# Patient Record
Sex: FEMALE | Race: WHITE | Employment: FULL TIME | URBAN - METROPOLITAN AREA
[De-identification: names, ages, dates, MRNs, and addresses within clinical notes are randomized per-mention and may not be internally consistent; named-entity substitution may affect disease eponyms.]

---

## 2017-01-15 RX ORDER — BUPROPION HYDROCHLORIDE 300 MG/1
TABLET ORAL
Qty: 30 TAB | Refills: 2 | Status: SHIPPED | OUTPATIENT
Start: 2017-01-15 | End: 2017-05-24 | Stop reason: SDUPTHER

## 2017-01-24 ENCOUNTER — OFFICE VISIT (OUTPATIENT)
Dept: FAMILY MEDICINE CLINIC | Age: 37
End: 2017-01-24

## 2017-01-24 VITALS
SYSTOLIC BLOOD PRESSURE: 115 MMHG | TEMPERATURE: 97.9 F | HEART RATE: 88 BPM | DIASTOLIC BLOOD PRESSURE: 59 MMHG | RESPIRATION RATE: 20 BRPM | BODY MASS INDEX: 32.88 KG/M2 | WEIGHT: 192.6 LBS | HEIGHT: 64 IN

## 2017-01-24 DIAGNOSIS — J06.9 VIRAL URI: Primary | ICD-10-CM

## 2017-01-24 DIAGNOSIS — J02.9 SORE THROAT: ICD-10-CM

## 2017-01-24 LAB
S PYO AG THROAT QL: NEGATIVE
VALID INTERNAL CONTROL?: YES

## 2017-01-24 NOTE — PATIENT INSTRUCTIONS
Sore Throat: Care Instructions  Your Care Instructions    Infection by bacteria or a virus causes most sore throats. Cigarette smoke, dry air, air pollution, allergies, and yelling can also cause a sore throat. Sore throats can be painful and annoying. Fortunately, most sore throats go away on their own. If you have a bacterial infection, your doctor may prescribe antibiotics. Follow-up care is a key part of your treatment and safety. Be sure to make and go to all appointments, and call your doctor if you are having problems. It's also a good idea to know your test results and keep a list of the medicines you take. How can you care for yourself at home? · If your doctor prescribed antibiotics, take them as directed. Do not stop taking them just because you feel better. You need to take the full course of antibiotics. · Gargle with warm salt water once an hour to help reduce swelling and relieve discomfort. Use 1 teaspoon of salt mixed in 1 cup of warm water. · Take an over-the-counter pain medicine, such as acetaminophen (Tylenol), ibuprofen (Advil, Motrin), or naproxen (Aleve). Read and follow all instructions on the label. · Be careful when taking over-the-counter cold or flu medicines and Tylenol at the same time. Many of these medicines have acetaminophen, which is Tylenol. Read the labels to make sure that you are not taking more than the recommended dose. Too much acetaminophen (Tylenol) can be harmful. · Drink plenty of fluids. Fluids may help soothe an irritated throat. Hot fluids, such as tea or soup, may help decrease throat pain. · Use over-the-counter throat lozenges to soothe pain. Regular cough drops or hard candy may also help. These should not be given to young children because of the risk of choking. · Do not smoke or allow others to smoke around you. If you need help quitting, talk to your doctor about stop-smoking programs and medicines.  These can increase your chances of quitting for good.  · Use a vaporizer or humidifier to add moisture to your bedroom. Follow the directions for cleaning the machine. When should you call for help? Call your doctor now or seek immediate medical care if:  · You have new or worse trouble swallowing. · Your sore throat gets much worse on one side. Watch closely for changes in your health, and be sure to contact your doctor if you do not get better as expected. Where can you learn more? Go to http://daly-karthik.info/. Enter [phone] in the search box to learn more about \"Sore Throat: Care Instructions. \"  Current as of: July 29, 2016  Content Version: 11.1  © 2462-5682 Cloud[employer], Comr[employer]. Care instructions adapted under license by Media[employer] (which disclaims liability or warranty for this information). If you have questions about a medical condition or this instruction, always ask your healthcare professional. [address] any warranty or liability for your use of this information.

## 2017-01-24 NOTE — PROGRESS NOTES
Xavier Hensley is a 39 y.o. female   Chief Complaint   Patient presents with    Hoarse    Ear Pain     Bilateral    pt states has sore throat since last week and hoparse voice which is getting better. Pt  has PNA. Pt denies SOB, mild cough at nightnon prod. Took motrin with no relief. Chief Complaint   Patient presents with    Hoarse    Ear Pain     Bilateral     she is a 39y.o. year old female who presents for evalution. Reviewed PmHx, RxHx, FmHx, SocHx, AllgHx and updated and dated in the chart. Review of Systems - negative except as listed above in the HPI    Objective:     Vitals:    01/24/17 1025   BP: 115/59   Pulse: 88   Resp: 20   Temp: 97.9 °F (36.6 °C)   TempSrc: Oral   Weight: 192 lb 9.6 oz (87.4 kg)   Height: 5' 4\" (1.626 m)       Current Outpatient Prescriptions   Medication Sig    buPROPion XL (WELLBUTRIN XL) 300 mg XL tablet take 1 tablet by mouth every morning    pantoprazole (PROTONIX) 40 mg tablet take 1 tablet by mouth daily    ferrous sulfate (IRON) 325 mg (65 mg iron) EC tablet Take 1 Tab by mouth two (2) times a day.  SUMAtriptan (IMITREX) 100 mg tablet Take 1 Tab by mouth once as needed for Migraine for up to 1 dose. May repeat dose in 2 hours if no relief of symptoms    baclofen (LIORESAL) 10 mg tablet Take 1 Tab by mouth three (3) times daily. No current facility-administered medications for this visit. Physical Examination: General appearance - alert, well appearing, and in no distress  Eyes - pupils equal and reactive, extraocular eye movements intact  Ears - bilateral TM's and external ear canals normal  Mouth - erythematous  Neck - adenopathy noted ant cerv tender  Chest - clear to auscultation, no wheezes, rales or rhonchi, symmetric air entry  Heart - normal rate, regular rhythm, normal S1, S2, no murmurs, rubs, clicks or gallops      Assessment/ Plan:   Katy was seen today for hoarse and ear pain.     Diagnoses and all orders for this visit:    Viral URI  Supportive care discussed with pt   Sore throat  -     AMB POC RAPID STREP A  Neg strep     Follow-up Disposition:  Return if symptoms worsen or fail to improve. I have discussed the diagnosis with the patient and the intended plan as seen in the above orders. The patient has received an after-visit summary and questions were answered concerning future plans. Pt conveyed understanding of plan.     Medication Side Effects and Warnings were discussed with patient      Michelle Watts DO

## 2017-01-24 NOTE — PROGRESS NOTES
Chief Complaint   Patient presents with    Hoarse    Ear Pain     Bilateral       Patirnt seen in office today with nasal congestion, sore throat and bilateral ear pain.

## 2017-02-16 ENCOUNTER — OFFICE VISIT (OUTPATIENT)
Dept: FAMILY MEDICINE CLINIC | Age: 37
End: 2017-02-16

## 2017-02-16 VITALS
WEIGHT: 190 LBS | OXYGEN SATURATION: 99 % | HEART RATE: 97 BPM | HEIGHT: 64 IN | TEMPERATURE: 98.2 F | SYSTOLIC BLOOD PRESSURE: 129 MMHG | DIASTOLIC BLOOD PRESSURE: 81 MMHG | RESPIRATION RATE: 18 BRPM | BODY MASS INDEX: 32.44 KG/M2

## 2017-02-16 DIAGNOSIS — S46.911A RIGHT SHOULDER STRAIN, INITIAL ENCOUNTER: Primary | ICD-10-CM

## 2017-02-16 RX ORDER — TRAMADOL HYDROCHLORIDE 50 MG/1
50 TABLET ORAL
Qty: 30 TAB | Refills: 0 | Status: SHIPPED | OUTPATIENT
Start: 2017-02-16 | End: 2017-03-24 | Stop reason: SDUPTHER

## 2017-02-16 RX ORDER — PREDNISONE 10 MG/1
TABLET ORAL
Qty: 21 TAB | Refills: 0 | Status: SHIPPED | OUTPATIENT
Start: 2017-02-16 | End: 2017-03-16 | Stop reason: ALTCHOICE

## 2017-02-16 RX ORDER — BACLOFEN 10 MG/1
10 TABLET ORAL 3 TIMES DAILY
Qty: 30 TAB | Refills: 0 | Status: SHIPPED | OUTPATIENT
Start: 2017-02-16 | End: 2017-06-20

## 2017-02-16 NOTE — MR AVS SNAPSHOT
Visit Information Date & Time Provider Department Dept. Phone Encounter #  
 2/16/2017 10:40 AM Carmelita Adams MD [address] [facility] [phone] [id number] Upcoming Health Maintenance Date Due  
 PAP AKA CERVICAL CYTOLOGY 1/13/2017 DTaP/Tdap/Td series (2 - Td) 8/26/2026 Allergies as of 2/16/2017  Review Complete On: 2/16/2017 By: Carmelita Adams MD  
  
 Severity Noted Reaction Type Reactions Clindamycin High 10/17/2014    Anaphylaxis, Other (comments) \"lightheaded, disoriented, makes my legs hurts, and makes my tongue swell\" Acetaminophen  01/13/2014   Systemic Other (comments) Due to liver problems Current Immunizations  Reviewed on 10/23/2013 Name Date Influenza Vaccine (Quad) PF 10/23/2013 Tdap 8/26/2016 Not reviewed this visit You Were Diagnosed With   
  
 Codes Comments Right shoulder strain, initial encounter    -  Primary ICD-10-CM: F05.781B ICD-9-CM: 840.9 Vitals BP Pulse Temp Resp Height(growth percentile) Weight(growth percentile) 129/81 (BP 1 Location: Left arm, BP Patient Position: Sitting) 97 98.2 °F (36.8 °C) (Oral) 18 5' 4\" (1.626 m) 190 lb (86.2 kg) LMP SpO2 BMI OB Status Smoking Status 01/24/2017 (Exact Date) 99% 32.61 kg/m2 Having regular periods Former Smoker Vitals History BMI and BSA Data Body Mass Index Body Surface Area  
 32.61 kg/m 2 1.97 m 2 Preferred Pharmacy Pharmacy Name Phone [id number] [place] & [address] [phone] Your Updated Medication List  
  
   
This list is accurate as of: 2/16/17 11:31 AM.  Always use your most recent med list.  
  
  
  
  
 baclofen 10 mg tablet Commonly known as:  LIORESAL Take 1 Tab by mouth three (3) times daily. buPROPion  mg XL tablet Commonly known as:  WELLBUTRIN XL  
take 1 tablet by mouth every morning ferrous sulfate 325 mg (65 mg iron) EC tablet Commonly known as:  IRON Take 1 Tab by mouth two (2) times a day. pantoprazole 40 mg tablet Commonly known as:  PROTONIX  
take 1 tablet by mouth daily  
  
 predniSONE 10 mg dose pack Commonly known as:  STERAPRED DS See administration instruction per 10mg dose pack  
  
 traMADol 50 mg tablet Commonly known as:  ULTRAM  
Take 1 Tab by mouth every eight (8) hours as needed for Pain. Max Daily Amount: 150 mg.  
  
  
  
  
Prescriptions Printed Refills  
 traMADol (ULTRAM) 50 mg tablet 0 Sig: Take 1 Tab by mouth every eight (8) hours as needed for Pain. Max Daily Amount: 150 mg.  
 Class: Print Route: Oral  
  
Prescriptions Sent to Pharmacy Refills  
 baclofen (LIORESAL) 10 mg tablet 0 Sig: Take 1 Tab by mouth three (3) times daily. Class: Normal  
 Pharmacy: [facility] [address], [facility] [address] Ph #: [phone] Route: Oral  
 predniSONE (STERAPRED DS) 10 mg dose pack 0 Sig: See administration instruction per 10mg dose pack Class: Normal  
 Pharmacy: [facility] [address], [address] Ph #: [phone] Patient Instructions Shoulder Stretches: Exercises Your Care Instructions Here are some examples of exercises for your shoulder. Start each exercise slowly. Ease off the exercise if you start to have pain. Your doctor or physical therapist will tell you when you can start these exercises and which ones will work best for you. How to do the exercises Note: These exercises should cause you to feel a gentle stretch, but no pain. Shoulder stretch 1.  a doorway and place one arm against the door frame. Your elbow should be a little higher than your shoulder. 2. Relax your shoulders as you lean forward, allowing your chest and shoulder muscles to stretch. You can also turn your body slightly away from your arm to stretch the muscles even more. 3. Hold for 15 to 30 seconds. 4. Repeat 2 to 4 times with each arm. Shoulder and chest stretch Shoulder and chest stretch 1. While sitting, relax your upper body so you slump slightly in your chair. 2. As you breathe in, straighten your back and open your arms out to the sides. 3. Gently pull your shoulder blades back and downward. 4. Hold for 15 to 30 seconds as your breathe normally. 5. Repeat 2 to 4 times. Overhead stretch 1. Reach up over your head with both arms. 2. Hold for 15 to 30 seconds. 3. Repeat 2 to 4 times. Follow-up care is a key part of your treatment and safety. Be sure to make and go to all appointments, and call your doctor if you are having problems. It's also a good idea to know your test results and keep a list of the medicines you take. Where can you learn more? Go to http://daly-karthik.info/. Enter S254 in the search box to learn more about \"Shoulder Stretches: Exercises. \" Current as of: May 23, 2016 Content Version: 11.1 © 0550-9219 [employer], Incorporated. Care instructions adapted under license by Aito Technologies (which disclaims liability or warranty for this information). If you have questions about a medical condition or this instruction, always ask your healthcare professional. [place] [address] any warranty or liability for your use of this information. Introducing [facility] & HEALTH SERVICES! Dear Korina Frances: Thank you for requesting a Nanomech account. Our records indicate that you already have an active Nanomech account. You can access your account anytime at [email or web address] Did you know that you can access your hospital and ER discharge instructions at any time in Nanomech? You can also review all of your test results from your hospital stay or ER visit. Additional Information If you have questions, please visit the Frequently Asked Questions section of the GRAVIDI website at [email or web address] Remember, GRAVIDI is NOT to be used for urgent needs. For medical emergencies, dial 911. Now available from your iPhone and Android! Please provide this summary of care documentation to your next provider. Your primary care clinician is listed as Brien Busch. If you have any questions after today's visit, please call [phone].

## 2017-02-16 NOTE — PATIENT INSTRUCTIONS
Shoulder Stretches: Exercises  Your Care Instructions  Here are some examples of exercises for your shoulder. Start each exercise slowly. Ease off the exercise if you start to have pain. Your doctor or physical therapist will tell you when you can start these exercises and which ones will work best for you. How to do the exercises  Note: These exercises should cause you to feel a gentle stretch, but no pain. Shoulder stretch    1.  a doorway and place one arm against the door frame. Your elbow should be a little higher than your shoulder. 2. Relax your shoulders as you lean forward, allowing your chest and shoulder muscles to stretch. You can also turn your body slightly away from your arm to stretch the muscles even more. 3. Hold for 15 to 30 seconds. 4. Repeat 2 to 4 times with each arm. Shoulder and chest stretch    Shoulder and chest stretch  1. While sitting, relax your upper body so you slump slightly in your chair. 2. As you breathe in, straighten your back and open your arms out to the sides. 3. Gently pull your shoulder blades back and downward. 4. Hold for 15 to 30 seconds as your breathe normally. 5. Repeat 2 to 4 times. Overhead stretch    1. Reach up over your head with both arms. 2. Hold for 15 to 30 seconds. 3. Repeat 2 to 4 times. Follow-up care is a key part of your treatment and safety. Be sure to make and go to all appointments, and call your doctor if you are having problems. It's also a good idea to know your test results and keep a list of the medicines you take. Where can you learn more? Go to http://daly-karthik.info/. Enter S254 in the search box to learn more about \"Shoulder Stretches: Exercises. \"  Current as of: May 23, 2016  Content Version: 11.1  © 5653-6679 Be At One, Why Not Give Back. Care instructions adapted under license by Multigig (which disclaims liability or warranty for this information).  If you have questions about a medical condition or this instruction, always ask your healthcare professional. [place] [address] any warranty or liability for your use of this information.

## 2017-02-16 NOTE — PROGRESS NOTES
Pt here c/o ongoing pain in right shoulder x 1 month. States that pain has progressively worse over the past few weeks. 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 Has been taking Advil OTC for pain. Pt is currently taking eptinezumab by infusion every 3 months for migraine study. Subjective: (As above and below)     Chief Complaint   Patient presents with    Shoulder Pain     right shoulder     she is a 44y.o. year old female who presents for evaluation. Reviewed PmHx, RxHx, FmHx, SocHx, AllgHx and updated in chart. Review of Systems - negative except as listed above    Objective:     Vitals:    02/16/17 1057   BP: 129/81   Pulse: 97   Resp: 18   Temp: 98.2 °F (36.8 °C)   TempSrc: Oral   SpO2: 99%   Weight: 190 lb (86.2 kg)   Height: 5' 4\" (1.626 m)     Physical Examination: General appearance - alert, well appearing, and in no distress  Mental status - normal mood, behavior, speech, dress, motor activity, and thought processes  Eyes - pupils equal and reactive, extraocular eye movements intact  Mouth - mucous membranes moist, pharynx normal without lesions  Chest - clear to auscultation, no wheezes, rales or rhonchi, symmetric air entry  Heart - normal rate, regular rhythm, normal S1, S2, no murmurs, rubs, clicks or gallops  Musculoskeletal - right shoulder pain with overhead and cross body motion    Assessment/ Plan:   1. Right shoulder strain, initial encounter  Orders Placed This Encounter    baclofen (LIORESAL) 10 mg tablet     Sig: Take 1 Tab by mouth three (3) times daily. Dispense:  30 Tab     Refill:  0    predniSONE (STERAPRED DS) 10 mg dose pack     Sig: See administration instruction per 10mg dose pack     Dispense:  21 Tab     Refill:  0    traMADol (ULTRAM) 50 mg tablet     Sig: Take 1 Tab by mouth every eight (8) hours as needed for Pain. Max Daily Amount: 150 mg.      Dispense:  30 Tab     Refill:  0     Reviewed home exercises     Follow-up Disposition: As needed  I have discussed the diagnosis with the patient and the intended plan as seen in the above orders. The patient has received an after-visit summary and questions were answered concerning future plans.      Medication Side Effects and Warnings were discussed with patient: yes  Patient Labs were reviewed: yes  Patient Past Records were reviewed:  yes    Deysi Jolly M.D.  '

## 2017-02-16 NOTE — PROGRESS NOTES
Pt here c/o ongoing pain in right shoulder x 1 month. States that pain has progressively worse over the past few weeks. 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 Has been taking Advil OTC for pain. Pt is currently taking eptinezumab by infusion every 3 months for migraine study.

## 2017-03-16 ENCOUNTER — OFFICE VISIT (OUTPATIENT)
Dept: FAMILY MEDICINE CLINIC | Age: 37
End: 2017-03-16

## 2017-03-16 ENCOUNTER — HOSPITAL ENCOUNTER (OUTPATIENT)
Dept: GENERAL RADIOLOGY | Age: 37
Discharge: HOME | End: 2017-03-16
Payer: COMMERCIAL

## 2017-03-16 VITALS
OXYGEN SATURATION: 98 % | DIASTOLIC BLOOD PRESSURE: 86 MMHG | WEIGHT: 192.3 LBS | RESPIRATION RATE: 16 BRPM | BODY MASS INDEX: 32.83 KG/M2 | TEMPERATURE: 97.8 F | HEART RATE: 84 BPM | SYSTOLIC BLOOD PRESSURE: 121 MMHG | HEIGHT: 64 IN

## 2017-03-16 DIAGNOSIS — M25.511 CHRONIC RIGHT SHOULDER PAIN: Primary | ICD-10-CM

## 2017-03-16 DIAGNOSIS — G89.29 CHRONIC RIGHT SHOULDER PAIN: Primary | ICD-10-CM

## 2017-03-16 DIAGNOSIS — M25.511 CHRONIC RIGHT SHOULDER PAIN: ICD-10-CM

## 2017-03-16 DIAGNOSIS — G89.29 CHRONIC RIGHT SHOULDER PAIN: ICD-10-CM

## 2017-03-16 PROCEDURE — 73030 X-RAY EXAM OF SHOULDER: CPT

## 2017-03-16 RX ORDER — DICLOFENAC SODIUM 75 MG/1
75 TABLET, DELAYED RELEASE ORAL 2 TIMES DAILY
Qty: 60 TAB | Refills: 2 | Status: SHIPPED | OUTPATIENT
Start: 2017-03-16 | End: 2017-06-20

## 2017-03-16 NOTE — PROGRESS NOTES
Chief Complaint   Patient presents with    Shoulder Pain     (R) Shoulder Pain     Pt reports that medication helped for a short time, but pain returned and seems worse, with radiation up into neck and down into elbow. Pt reports that ultram, baclofen help with sleep but make her so tired that she oversleeps. Ice helps while it is on, but no long term relief. Pt reports that pain bother her while driving. Subjective: (As above and below)     Chief Complaint   Patient presents with    Shoulder Pain     (R) Shoulder Pain     she is a 44y.o. year old female who presents for evaluation. Reviewed PmHx, RxHx, FmHx, SocHx, AllgHx and updated in chart. Review of Systems - negative except as listed above    Objective:     Vitals:    03/16/17 1100   BP: 121/86   Pulse: 84   Resp: 16   Temp: 97.8 °F (36.6 °C)   TempSrc: Oral   SpO2: 98%   Weight: 192 lb 4.8 oz (87.2 kg)   Height: 5' 4\" (1.626 m)     Physical Examination: General appearance - alert, well appearing, and in no distress  Mental status - normal mood, behavior, speech, dress, motor activity, and thought processes  Mouth - mucous membranes moist, pharynx normal without lesions  Chest - clear to auscultation, no wheezes, rales or rhonchi, symmetric air entry  Heart - normal rate, regular rhythm, normal S1, S2, no murmurs, rubs, clicks or gallops  Musculoskeletal - limited ROM right shoulder    Assessment/ Plan:   1. Chronic right shoulder pain  -check x-ray, refer to PT if x-ray is normal, consider ortho if problem is identified. - XR SHOULDER RT AP/LAT MIN 2 V; Future  - REFERRAL TO ORTHOPEDIC SURGERY     Follow-up Disposition: As needed  I have discussed the diagnosis with the patient and the intended plan as seen in the above orders. The patient has received an after-visit summary and questions were answered concerning future plans.      Medication Side Effects and Warnings were discussed with patient: yes  Patient Labs were reviewed: yes  Patient Past Records were reviewed:  yes    Tino Stearns M.D.

## 2017-03-16 NOTE — MR AVS SNAPSHOT
Visit Information Date & Time Provider Department Dept. Phone Encounter #  
 3/16/2017 10:50 AM Soledad Rouse MD [address] [facility] [phone] [id number] Upcoming Health Maintenance Date Due  
 PAP AKA CERVICAL CYTOLOGY 1/13/2017 DTaP/Tdap/Td series (2 - Td) 8/26/2026 Allergies as of 3/16/2017  Review Complete On: 3/16/2017 By: Soledad Rouse MD  
  
 Severity Noted Reaction Type Reactions Clindamycin High 10/17/2014    Anaphylaxis, Other (comments) \"lightheaded, disoriented, makes my legs hurts, and makes my tongue swell\" Acetaminophen  01/13/2014   Systemic Other (comments) Due to liver problems Current Immunizations  Reviewed on 10/23/2013 Name Date Influenza Vaccine (Quad) PF 10/23/2013 Tdap 8/26/2016 Not reviewed this visit You Were Diagnosed With   
  
 Codes Comments Chronic right shoulder pain    -  Primary ICD-10-CM: M25.511, G89.29 ICD-9-CM: 719.41, 338.29 Vitals BP Pulse Temp Resp Height(growth percentile) Weight(growth percentile) 121/86 84 97.8 °F (36.6 °C) (Oral) 16 5' 4\" (1.626 m) 192 lb 4.8 oz (87.2 kg) SpO2 BMI OB Status Smoking Status 98% 33.01 kg/m2 Having regular periods Former Smoker Vitals History BMI and BSA Data Body Mass Index Body Surface Area 33.01 kg/m 2 1.98 m 2 Preferred Pharmacy [facility] Phone [id number] [address] [phone] Your Updated Medication List  
  
   
This list is accurate as of: 3/16/17 11:24 AM.  Always use your most recent med list.  
  
  
  
  
 baclofen 10 mg tablet Commonly known as:  LIORESAL Take 1 Tab by mouth three (3) times daily. buPROPion  mg XL tablet Commonly known as:  WELLBUTRIN XL  
take 1 tablet by mouth every morning  
  
 diclofenac EC 75 mg EC tablet Commonly known as:  VOLTAREN Take 1 Tab by mouth two (2) times a day. ferrous sulfate 325 mg (65 mg iron) EC tablet Commonly known as:  IRON Take 1 Tab by mouth two (2) times a day. pantoprazole 40 mg tablet Commonly known as:  PROTONIX  
take 1 tablet by mouth daily  
  
 traMADol 50 mg tablet Commonly known as:  ULTRAM  
Take 1 Tab by mouth every eight (8) hours as needed for Pain. Max Daily Amount: 150 mg.  
  
  
  
  
Prescriptions Sent to Pharmacy Refills  
 diclofenac EC (VOLTAREN) 75 mg EC tablet 2 Sig: Take 1 Tab by mouth two (2) times a day. Class: Normal  
 Pharmacy: [id number], [facility] [facility] Ph #: [phone] Route: Oral  
  
We Performed the Following REFERRAL TO ORTHOPEDIC SURGERY [REF62 Custom] To-Do List   
 03/16/2017 Imaging:  XR SHOULDER RT AP/LAT MIN 2 V Referral Information Referral ID Referred By Referred To  
  
 [id number] [facility] [address] [facility] [address] [place] [address] Visits Status Start Date End Date 1 New Request 3/16/17 3/16/18 If your referral has a status of pending review or denied, additional information will be sent to support the outcome of this decision. Introducing [facility] & HEALTH SERVICES! Dear Kandace Gilmore: Thank you for requesting a HIT Application Solutions account. Our records indicate that you already have an active H[employer] Application Solutions account. You can access your account anytime at [email or web address] Did you know that you can access your hospital and ER discharge instructions at any time in HIT Application Solutions? You can also review all of your test results from your hospital stay or ER visit. Additional Information If you have questions, please visit the Frequently Asked Questions section of the H[employer] Application Solutions website at [email or web address] Remember, H[employer] Application Solutions is NOT to be used for urgent needs. For medical emergencies, dial 911. Now available from your iPhone and Android! Please provide this summary of care documentation to your next provider. Your primary care clinician is listed as Adelfo Tovar. If you have any questions after today's visit, please call [phone].

## 2017-03-16 NOTE — PROGRESS NOTES
Chief Complaint   Patient presents with    Shoulder Pain     (R) Shoulder Pain     1. Have you been to the ER, urgent care clinic since your last visit? Hospitalized since your last visit? No    2. Have you seen or consulted any other health care providers outside of the [address] [place] [address] since your last visit? Include any pap smears or colon screening.  No

## 2017-03-22 DIAGNOSIS — K21.9 GASTROESOPHAGEAL REFLUX DISEASE WITHOUT ESOPHAGITIS: ICD-10-CM

## 2017-03-22 RX ORDER — PANTOPRAZOLE SODIUM 40 MG/1
TABLET, DELAYED RELEASE ORAL
Qty: 90 TAB | Refills: 0 | Status: SHIPPED | OUTPATIENT
Start: 2017-03-22 | End: 2017-06-28 | Stop reason: SDUPTHER

## 2017-03-24 ENCOUNTER — PATIENT MESSAGE (OUTPATIENT)
Dept: FAMILY MEDICINE CLINIC | Age: 37
End: 2017-03-24

## 2017-03-24 RX ORDER — TRAMADOL HYDROCHLORIDE 50 MG/1
50 TABLET ORAL
Qty: 30 TAB | Refills: 0 | OUTPATIENT
Start: 2017-03-24 | End: 2017-06-20

## 2017-04-23 RX ORDER — SUMATRIPTAN 100 MG/1
TABLET, FILM COATED ORAL
Qty: 12 TAB | Refills: 0 | Status: SHIPPED | OUTPATIENT
Start: 2017-04-23 | End: 2017-06-20

## 2017-04-24 ENCOUNTER — OFFICE VISIT (OUTPATIENT)
Dept: FAMILY MEDICINE CLINIC | Age: 37
End: 2017-04-24

## 2017-04-24 VITALS
OXYGEN SATURATION: 99 % | HEART RATE: 66 BPM | DIASTOLIC BLOOD PRESSURE: 80 MMHG | RESPIRATION RATE: 16 BRPM | SYSTOLIC BLOOD PRESSURE: 100 MMHG | WEIGHT: 195.38 LBS | TEMPERATURE: 98.1 F | HEIGHT: 64 IN | BODY MASS INDEX: 33.35 KG/M2

## 2017-04-24 DIAGNOSIS — R33.9 INCOMPLETE EMPTYING OF BLADDER: ICD-10-CM

## 2017-04-24 DIAGNOSIS — R30.9 URINARY PAIN: ICD-10-CM

## 2017-04-24 DIAGNOSIS — R35.0 URINARY FREQUENCY: Primary | ICD-10-CM

## 2017-04-24 LAB
BILIRUB UR QL STRIP: NEGATIVE
GLUCOSE UR-MCNC: NEGATIVE MG/DL
KETONES P FAST UR STRIP-MCNC: NEGATIVE MG/DL
PH UR STRIP: 7 [PH] (ref 4.6–8)
PROT UR QL STRIP: NORMAL MG/DL
SP GR UR STRIP: 1.01 (ref 1–1.03)
UA UROBILINOGEN AMB POC: NORMAL (ref 0.2–1)
URINALYSIS CLARITY POC: CLEAR
URINALYSIS COLOR POC: YELLOW
URINE BLOOD POC: NEGATIVE
URINE LEUKOCYTES POC: NORMAL
URINE NITRITES POC: NEGATIVE

## 2017-04-24 RX ORDER — LORATADINE 10 MG/1
10 TABLET ORAL
COMMUNITY

## 2017-04-24 RX ORDER — CIPROFLOXACIN 500 MG/1
500 TABLET ORAL 2 TIMES DAILY
Qty: 10 TAB | Refills: 0 | Status: SHIPPED | OUTPATIENT
Start: 2017-04-24 | End: 2017-04-29

## 2017-04-24 NOTE — LETTER
4/24/2017 8:31 AM 
 
Ms. Katy Orona [address] [place] [phone] Patient was seen in the office today due to illness.   
 
 
Sincerely, 
 
 
Rafael Hoyt NP

## 2017-04-24 NOTE — PROGRESS NOTES
1. Have you been to the ER, urgent care clinic since your last visit? Hospitalized since your last visit? No    2. Have you seen or consulted any other health care providers outside of the [facility] since your last visit? Include any pap smears or colon screening. No    Chief Complaint   Patient presents with    Urinary Frequency     pain & incomplete emptying x today     Pt states she has urinary frequency, pain & incomplete emptying starting this morning.

## 2017-04-24 NOTE — MR AVS SNAPSHOT
Visit Information Date & Time Provider Department Dept. Phone Encounter #  
 4/24/2017  8:30 AM Rachel Samayoa, JAYLA [address] [facility] [phone] [id number] Upcoming Health Maintenance Date Due  
 PAP AKA CERVICAL CYTOLOGY 1/13/2017 DTaP/Tdap/Td series (2 - Td) 8/26/2026 Allergies as of 4/24/2017  Review Complete On: 4/24/2017 By: Alice Paulson LPN Severity Noted Reaction Type Reactions Clindamycin High 10/17/2014    Anaphylaxis, Other (comments) \"lightheaded, disoriented, makes my legs hurts, and makes my tongue swell\" Acetaminophen  01/13/2014   Systemic Other (comments) Due to liver problems Current Immunizations  Reviewed on 10/23/2013 Name Date Influenza Vaccine (Quad) PF 10/23/2013 Tdap 8/26/2016 Not reviewed this visit You Were Diagnosed With   
  
 Codes Comments Urinary frequency    -  Primary ICD-10-CM: R35.0 ICD-9-CM: 788.41 Incomplete emptying of bladder     ICD-10-CM: R33.9 ICD-9-CM: 788.21 Urinary pain     ICD-10-CM: R30.9 ICD-9-CM: 828. 1 Vitals BP Pulse Temp Resp Height(growth percentile) Weight(growth percentile) 100/80 66 98.1 °F (36.7 °C) (Oral) 16 5' 4\" (1.626 m) 195 lb 6 oz (88.6 kg) LMP SpO2 BMI OB Status Smoking Status 04/01/2017 (Approximate) 99% 33.54 kg/m2 Having regular periods Former Smoker Vitals History BMI and BSA Data Body Mass Index Body Surface Area  
 33.54 kg/m 2 2 m 2 Preferred Pharmacy Pharmacy Name Phone [address] [address] Your Updated Medication List  
  
   
This list is accurate as of: 4/24/17  8:31 AM.  Always use your most recent med list.  
  
  
  
  
 baclofen 10 mg tablet Commonly known as:  LIORESAL Take 1 Tab by mouth three (3) times daily. buPROPion  mg XL tablet Commonly known as:  WELLBUTRIN XL  
take 1 tablet by mouth every morning ciprofloxacin HCl 500 mg tablet Commonly known as:  CIPRO Take 1 Tab by mouth two (2) times a day for 5 days. CLARITIN 10 mg tablet Generic drug:  loratadine Take 10 mg by mouth. diclofenac EC 75 mg EC tablet Commonly known as:  VOLTAREN Take 1 Tab by mouth two (2) times a day. ferrous sulfate 325 mg (65 mg iron) EC tablet Commonly known as:  IRON Take 1 Tab by mouth two (2) times a day. pantoprazole 40 mg tablet Commonly known as:  PROTONIX  
take 1 tablet by mouth daily SUMAtriptan 100 mg tablet Commonly known as:  IMITREX  
take 1 tablet by mouth ONCE if needed for migraines for UP TO 1 DOSE. MAY REPEAT DOSE IN 2 HOURS IF NO RELIEF OF SYMPTOMS  
  
 traMADol 50 mg tablet Commonly known as:  ULTRAM  
Take 1 Tab by mouth every eight (8) hours as needed for Pain. Max Daily Amount: 150 mg.  
  
  
  
  
Prescriptions Sent to Pharmacy Refills  
 ciprofloxacin HCl (CIPRO) 500 mg tablet 0 Sig: Take 1 Tab by mouth two (2) times a day for 5 days. Class: Normal  
 Pharmacy: [address] [place] [address], [address] [place] [address] #: [phone] Route: Oral  
  
We Performed the Following AMB POC URINALYSIS DIP STICK AUTO W/O MICRO [63898 CPT(R)] Introducing [facility] & HEALTH SERVICES! Dear Jacob Flores: Thank you for requesting a Conscious Box account. Our records indicate that you already have an active Conscious Box account. You can access your account anytime at [email or web address] Did you know that you can access your hospital and ER discharge instructions at any time in Conscious Box? You can also review all of your test results from your hospital stay or ER visit. Additional Information If you have questions, please visit the Frequently Asked Questions section of the Conscious Box website at [email or web address] Remember, Conscious Box is NOT to be used for urgent needs. For medical emergencies, dial 911. Now available from your iPhone and Android! Please provide this summary of care documentation to your next provider. Your primary care clinician is listed as Anaya Yeung. If you have any questions after today's visit, please call [phone].

## 2017-04-24 NOTE — PROGRESS NOTES
Maribel Perdomo is a 44 y.o. female who complains of dysuria, frequency, urgency for 4 days. Patient denies flank pain, vomiting, fever, unusual vaginal discharge. Patient does not have a history of recurrent UTI. Patient does not have a history of pyelonephritis. Review of Systems  Pertinent items are noted in HPI. Objective:     Visit Vitals    /80    Pulse 66    Temp 98.1 °F (36.7 °C) (Oral)    Resp 16    Ht 5' 4\" (1.626 m)    Wt 195 lb 6 oz (88.6 kg)    LMP 04/01/2017 (Approximate)    SpO2 99%    BMI 33.54 kg/m2     General:  alert, cooperative, no distress   Abdomen: soft, nontender, nondistended, no masses or organomegaly. Back:  CVA tenderness absent   :  defer exam     Laboratory:   Urine dipstick shows positive for leukocytes. Micro exam: not done. Assessment/Plan:     Acute cystitis     1. ciprofloxacin  2. Maintain adequate hydration  3. May use OTC pyridium as desired, which will turn urine orange/red color  4. Follow up if symptoms not improving, and prn. Encounter Diagnoses   Name Primary?  Urinary frequency Yes    Incomplete emptying of bladder     Urinary pain      Orders Placed This Encounter    AMB POC URINALYSIS DIP STICK AUTO W/O MICRO    loratadine (CLARITIN) 10 mg tablet    ciprofloxacin HCl (CIPRO) 500 mg tablet   . I have discussed the diagnosis with the patient and the intended plan as seen in the above orders. The patient has received an after-visit summary and questions were answered concerning future plans. Patient conveyed understanding of the plan at the time of the visit.     Dorcas Ríos, MSN, ANP  4/24/2017

## 2017-05-24 RX ORDER — BUPROPION HYDROCHLORIDE 300 MG/1
TABLET ORAL
Qty: 30 TAB | Refills: 2 | Status: SHIPPED | OUTPATIENT
Start: 2017-05-24 | End: 2017-09-09 | Stop reason: SDUPTHER

## 2017-06-20 ENCOUNTER — HOSPITAL ENCOUNTER (OUTPATIENT)
Dept: LAB | Age: 37
Discharge: HOME | End: 2017-06-20

## 2017-06-20 ENCOUNTER — OFFICE VISIT (OUTPATIENT)
Dept: OBGYN CLINIC | Age: 37
End: 2017-06-20

## 2017-06-20 VITALS
WEIGHT: 196 LBS | BODY MASS INDEX: 33.46 KG/M2 | SYSTOLIC BLOOD PRESSURE: 108 MMHG | HEIGHT: 64 IN | DIASTOLIC BLOOD PRESSURE: 68 MMHG

## 2017-06-20 DIAGNOSIS — N93.9 ABNORMAL UTERINE BLEEDING (AUB): Primary | ICD-10-CM

## 2017-06-20 NOTE — PROGRESS NOTES
Abnormal bleeding note      Katy Orona is a 44 y.o. female who complains of vaginal bleeding problems. Had US ordered by PCP last year - normal. No end bx done. She was put on iron. Her current method of family planning is tubal ligation. She developed this problem 2016 and it has gradually worsened. Last period lasted from 17-17. She has had vaginal bleeding which she describes as bright red, heavy, and lasting longer than normal. Periods were normal prior to 2016    Patient uses disposable menstrual cups. Associated symptoms include odor only during cycle and bleeding during/after intercourse. Alleviating factors: none    Aggravating factors: none      The patient is sexually active. Last Pap smear: , HPV neg    Her relevant past medical history:  Hx of anemia. Taking supplements 2x day. Blood work in cc. Past Medical History:   Diagnosis Date    Coagulation disorder ([address]     anemic    Dysmenorrhea 3/15/2010    Endometriosis 3/15/2010    GERD (gastroesophageal reflux disease)     Ill-defined condition     depression    Migraine 3/15/2010    Ulcer ([address]         Past Surgical History:   Procedure Laterality Date    C-SEC ONLY,PREV C-SEC      time 3    HX CHOLECYSTECTOMY  3/2012    HX GYN       10 799 2-06    HX GYN      d  & C    HX ORTHOPAEDIC  2012    right knee surg     Social History     Occupational History    Not on file.      Social History Main Topics    Smoking status: Former Smoker     Packs/day: 0.25     Years: 20.00     Quit date: 3/21/2014    Smokeless tobacco: Never Used    Alcohol use 0.5 oz/week     1 Shots of liquor per week      Comment: rarely    Drug use: No    Sexual activity: Yes     Partners: Male     Birth control/ protection: Surgical      Comment: tubal ligation     Family History   Problem Relation Age of Onset    Hypertension Father     Hypertension Brother     Heart Disease Maternal Grandfather Allergies   Allergen Reactions    Clindamycin Anaphylaxis and Other (comments)     \"lightheaded, disoriented, makes my legs hurts, and makes my tongue swell\"    Acetaminophen Other (comments)     Due to liver problems     Prior to Admission medications    Medication Sig Start Date End Date Taking? Authorizing Provider   buPROPion XL (WELLBUTRIN XL) 300 mg XL tablet take 1 tablet by mouth every morning 5/24/17  Yes Neri Yarbrough NP   loratadine (CLARITIN) 10 mg tablet Take 10 mg by mouth. Yes Historical Provider   pantoprazole (PROTONIX) 40 mg tablet take 1 tablet by mouth daily 3/22/17  Yes Neri Yarbrough NP   ferrous sulfate (IRON) 325 mg (65 mg iron) EC tablet Take 1 Tab by mouth two (2) times a day. 8/30/16  Yes Neri Yarbrough NP   SUMAtriptan (IMITREX) 100 mg tablet take 1 tablet by mouth ONCE if needed for migraines for UP TO 1 DOSE. MAY REPEAT DOSE IN 2 HOURS IF NO RELIEF OF SYMPTOMS 4/23/17   Neri Yarbrough NP   traMADol (ULTRAM) 50 mg tablet Take 1 Tab by mouth every eight (8) hours as needed for Pain. Max Daily Amount: 150 mg. 3/24/17   Xena Duran MD   diclofenac EC (VOLTAREN) 75 mg EC tablet Take 1 Tab by mouth two (2) times a day. 3/16/17   Xena Duran MD   baclofen (LIORESAL) 10 mg tablet Take 1 Tab by mouth three (3) times daily.  2/16/17   Xena Duran MD        Review of Systems - History obtained from the patient  Constitutional: negative for weight loss, fever, night sweats  HEENT: negative for hearing loss, earache, congestion, snoring, sorethroat  CV: negative for chest pain, palpitations, edema  Resp: negative for cough, shortness of breath, wheezing  Breast: negative for breast lumps, nipple discharge, galactorrhea  GI: negative for change in bowel habits, abdominal pain, black or bloody stools  : negative for frequency, dysuria, hematuria  MSK: negative for back pain, joint pain, muscle pain  Skin: negative for itching, rash, hives  Neuro: negative for dizziness, headache, confusion, weakness  Psych: negative for anxiety, depression, change in mood  Heme/lymph: negative for bleeding, bruising, pallor      Objective:    Visit Vitals    /68    Ht 5' 4\" (1.626 m)    Wt 196 lb (88.9 kg)    LMP 05/25/2017 (Exact Date)    BMI 33.64 kg/m2          PHYSICAL EXAMINATION    Constitutional  · Appearance: well-nourished, well developed, alert, in no acute distress    HENT  · Head and Face: appears normal    Neck  · Inspection/Palpation: normal appearance, no masses or tenderness  · Lymph Nodes: no lymphadenopathy present  · Thyroid: gland size normal, nontender, no nodules or masses present on palpation  ·   Breasts  · Inspection of Breasts: breasts symmetrical, no skin changes, no discharge present, nipple appearance normal, no skin retraction present  · Palpation of Breasts and Axillae: no masses present on palpation, no breast tenderness  · Axillary Lymph Nodes: no lymphadenopathy present    Gastrointestinal  · Abdominal Examination: abdomen non-tender to palpation, normal bowel sounds, no masses present  · Liver and spleen: no hepatomegaly present, spleen not palpable  · Hernias: no hernias identified    Genitourinary  · External Genitalia: normal appearance for age, no discharge present, no tenderness present, no inflammatory lesions present, no masses present, no atrophy present  · Vagina: normal vaginal vault without central or paravaginal defects, no discharge present, no inflammatory lesions present, no masses present  · Bladder: non-tender to palpation  · Urethra: appears normal  · Cervix: normal   · Uterus: normal size, shape and consistency  · Adnexa: no adnexal tenderness present, no adnexal masses present  · Perineum: perineum within normal limits, no evidence of trauma, no rashes or skin lesions present  · Anus: anus within normal limits, no hemorrhoids present  · Inguinal Lymph Nodes: no lymphadenopathy present    Skin  · General Inspection: no rash, no lesions identified    Neurologic/Psychiatric  · Mental Status:  · Orientation: grossly oriented to person, place and time  · Mood and Affect: mood normal, affect appropriate    Assessment:   AUB    Plan:   End bx done  TSH, prolactin  Provera 10mg x 10d  Menstrual calendar          [facility]  OFFICE PROCEDURE PROGRESS NOTE        Chart reviewed for the following:   Ryanne BROWN LPN, have reviewed the History, Physical and updated the Allergic reactions for Necol B Short     TIME OUT performed immediately prior to start of procedure:   Lachelle BROWN LPN, have performed the following reviews on Necol B Short prior to the start of the procedure:            * Patient was identified by name and date of birth   * Agreement on procedure being performed was verified  * Risks and Benefits explained to the patient  * Procedure site verified and marked as necessary  * Patient was positioned for comfort  * Consent was signed and verified     Time: 2:17 PM        Date of procedure: 2017    Procedure performed by:  Santos Ro MD    How tolerated by patient: tolerated the procedure well with no complications    Post Procedural Pain Scale: 2 - Hurts Little Bit    Comments: none    Procedure note: Endometrial biopsy    Katy Orona is a ,  44 y.o. female [facility] whose Patient's last menstrual period was 2017 (exact date). was on 2017. The patient has a history of The encounter diagnosis was Abnormal uterine bleeding (AUB). and presents for an endometrial biopsy. Indications:   After the indications, risks, benefits, and alternatives to performing an endometrial biopsy were explained to the patient, her questions were answered and informed consent was obtained. Procedure: The patient was placed on the table in the dorsal lithotomy position. A bimanual exam showed the uterus to be anterior. The uterus was normal size.   A speculum was placed in the vagina. The cervix was visualized and prepped with zephrin. A tenaculum was not placed on the anterior lip of the cervix for traction. It was not necessary to dilate the cervix. A pipelle was passed through the endocervical canal without difficulty. The uterus was sounded to 10 cm's. A moderate amount of tissue was returned. This tissue was placed in formalin and sent to pathology. It was felt that an adequate sample was obtained. due to cervical stenosis. The patient tolerated the procedure well and she reported mild cramping. The tenaculum and speculum were removed. Post Procedural Status: The patient was observed for 5  minutes. She had mild cramping at the time of discharge. There were no complications. The patient was discharged in stable condition.

## 2017-06-21 ENCOUNTER — TELEPHONE (OUTPATIENT)
Dept: OBGYN CLINIC | Age: 37
End: 2017-06-21

## 2017-06-21 LAB
PROLACTIN SERPL-MCNC: 8.5 NG/ML (ref 4.8–23.3)
SPECIMEN SENT TO LAB,INSX: NORMAL
TSH SERPL DL<=0.005 MIU/L-ACNC: 0.9 UIU/ML (ref 0.45–4.5)

## 2017-06-21 RX ORDER — MEDROXYPROGESTERONE ACETATE 10 MG/1
10 TABLET ORAL DAILY
Qty: 10 TAB | Refills: 0 | Status: SHIPPED | OUTPATIENT
Start: 2017-06-21 | End: 2018-02-19 | Stop reason: ALTCHOICE

## 2017-06-21 NOTE — TELEPHONE ENCOUNTER
Call received at 11:08 am    44year old patient last seen in the office on 6/20/17. Patient calling to say that she was supposed to have a prescription sent yesterday and it was not at the pharmacy. 6/20/17  Plan:   End bx done  TSH, prolactin  Provera 10mg x 10d  Menstrual calendaroffice on 6/20/17    ? ok to send Provera .  Please advise

## 2017-06-21 NOTE — TELEPHONE ENCOUNTER
Patient advised of MD recommendations and prescription sent as per MD order to patient preferred pharmacy. Patient verbalized understanding.

## 2017-06-28 DIAGNOSIS — K21.9 GASTROESOPHAGEAL REFLUX DISEASE WITHOUT ESOPHAGITIS: ICD-10-CM

## 2017-06-28 RX ORDER — PANTOPRAZOLE SODIUM 40 MG/1
TABLET, DELAYED RELEASE ORAL
Qty: 90 TAB | Refills: 0 | Status: SHIPPED | OUTPATIENT
Start: 2017-06-28 | End: 2017-09-23 | Stop reason: SDUPTHER

## 2017-06-29 ENCOUNTER — TELEPHONE (OUTPATIENT)
Dept: OBGYN CLINIC | Age: 37
End: 2017-06-29

## 2017-06-29 NOTE — TELEPHONE ENCOUNTER
Patient calling stating that she has been taking the provera and has taken 7 pills already but reports that she is having heavy bleeding and changing her menstrual cup q15min because it is leaking onto her pad. Patient is unsure if this is normal as the patient does not wear just pads. She does report cramping and does have some clotting. Please advise.

## 2017-06-29 NOTE — TELEPHONE ENCOUNTER
Finish provera. Should stop using cup and let the withdrawal bleed occur.  It will be very heavy but will stop

## 2017-06-30 LAB
DX ICD CODE: NORMAL
PATH REPORT.FINAL DX SPEC: NORMAL
PATH REPORT.GROSS SPEC: NORMAL
PATH REPORT.SITE OF ORIGIN SPEC: NORMAL
PATHOLOGIST NAME: NORMAL
PAYMENT PROCEDURE: NORMAL

## 2017-07-06 NOTE — PROGRESS NOTES
Patient aware of results and MD recommendations by phone. Patient aware to contact office back if no improvement on taking the medication.

## 2017-09-11 RX ORDER — BUPROPION HYDROCHLORIDE 300 MG/1
TABLET ORAL
Qty: 30 TAB | Refills: 2 | Status: SHIPPED | OUTPATIENT
Start: 2017-09-11 | End: 2017-12-17 | Stop reason: SDUPTHER

## 2017-09-23 DIAGNOSIS — K21.9 GASTROESOPHAGEAL REFLUX DISEASE WITHOUT ESOPHAGITIS: ICD-10-CM

## 2017-09-24 RX ORDER — PANTOPRAZOLE SODIUM 40 MG/1
TABLET, DELAYED RELEASE ORAL
Qty: 90 TAB | Refills: 0 | Status: SHIPPED | OUTPATIENT
Start: 2017-09-24 | End: 2018-02-14 | Stop reason: SDUPTHER

## 2017-12-19 RX ORDER — BUPROPION HYDROCHLORIDE 300 MG/1
TABLET ORAL
Qty: 30 TAB | Refills: 1 | Status: SHIPPED | OUTPATIENT
Start: 2017-12-19 | End: 2018-03-17 | Stop reason: SDUPTHER

## 2018-01-16 ENCOUNTER — OFFICE VISIT (OUTPATIENT)
Dept: FAMILY MEDICINE CLINIC | Age: 38
End: 2018-01-16

## 2018-01-16 VITALS
WEIGHT: 189 LBS | HEIGHT: 64 IN | DIASTOLIC BLOOD PRESSURE: 68 MMHG | HEART RATE: 102 BPM | OXYGEN SATURATION: 99 % | RESPIRATION RATE: 18 BRPM | SYSTOLIC BLOOD PRESSURE: 104 MMHG | TEMPERATURE: 100.3 F | BODY MASS INDEX: 32.27 KG/M2

## 2018-01-16 DIAGNOSIS — J11.1 INFLUENZA: Primary | ICD-10-CM

## 2018-01-16 RX ORDER — OSELTAMIVIR PHOSPHATE 75 MG/1
75 CAPSULE ORAL 2 TIMES DAILY
Qty: 10 CAP | Refills: 0 | Status: SHIPPED | OUTPATIENT
Start: 2018-01-16 | End: 2018-01-21

## 2018-01-16 NOTE — LETTER
NOTIFICATION RETURN TO WORK / SCHOOL 
 
1/16/2018 11:57 AM 
 
Ms. Katy Orona [address] [address] To Whom It May Concern: 
 
Katy Orona is currently under the care of [address]. She will return to work on: 1/22/18 If there are questions or concerns please have the patient contact our office. Sincerely, Annette Pozo, DO

## 2018-01-16 NOTE — PROGRESS NOTES
Arjun Olivo is a 44 y.o. female   Chief Complaint   Patient presents with    Cold Symptoms    pt states she felt a little blah last night and then this morning went to work at 430am until 10am then started to have a fever this am currently T 100.3. Pt has not taken anything and also has body aches. Pt drives a school bus.      she is a 44y.o. year old female who presents for evalution. Reviewed PmHx, RxHx, FmHx, SocHx, AllgHx and updated and dated in the chart. Review of Systems - negative except as listed above in the HPI    Objective:     Vitals:    01/16/18 1124   BP: 104/68   Pulse: (!) 102   Resp: 18   Temp: 100.3 °F (37.9 °C)   TempSrc: Oral   SpO2: 99%   Weight: 189 lb (85.7 kg)   Height: 5' 4\" (1.626 m)       Current Outpatient Prescriptions   Medication Sig    oseltamivir (TAMIFLU) 75 mg capsule Take 1 Cap by mouth two (2) times a day for 5 days.  buPROPion XL (WELLBUTRIN XL) 300 mg XL tablet TAKE ONE TABLET BY MOUTH EVERY MORNING    pantoprazole (PROTONIX) 40 mg tablet TAKE ONE TABLET BY MOUTH DAILY    medroxyPROGESTERone (PROVERA) 10 mg tablet Take 1 Tab by mouth daily.  loratadine (CLARITIN) 10 mg tablet Take 10 mg by mouth.  ferrous sulfate (IRON) 325 mg (65 mg iron) EC tablet Take 1 Tab by mouth two (2) times a day. No current facility-administered medications for this visit.         Physical Examination: General appearance - alert, well appearing, and in no distress  Eyes - pupils equal and reactive, extraocular eye movements intact  Ears - bilateral TM's and external ear canals normal  Nose - normal and patent, no erythema, discharge or polyps  Mouth - mucous membranes moist, pharynx normal without lesions  Neck - supple, no significant adenopathy  Chest - clear to auscultation, no wheezes, rales or rhonchi, symmetric air entry  Heart - normal rate, regular rhythm, normal S1, S2, no murmurs, rubs, clicks or gallops  Abdomen - soft, nontender, nondistended, no masses or organomegaly      Assessment/ Plan:   Diagnoses and all orders for this visit:    1. Influenza  -     oseltamivir (TAMIFLU) 75 mg capsule; Take 1 Cap by mouth two (2) times a day for 5 days. motrin prn  Follow-up Disposition:  Return if symptoms worsen or fail to improve. I have discussed the diagnosis with the patient and the intended plan as seen in the above orders. The patient has received an after-visit summary and questions were answered concerning future plans. Pt conveyed understanding of plan.     Medication Side Effects and Warnings were discussed with patient      Annette Pozo DO

## 2018-01-16 NOTE — PATIENT INSTRUCTIONS

## 2018-02-14 DIAGNOSIS — K21.9 GASTROESOPHAGEAL REFLUX DISEASE WITHOUT ESOPHAGITIS: ICD-10-CM

## 2018-02-14 RX ORDER — PANTOPRAZOLE SODIUM 40 MG/1
TABLET, DELAYED RELEASE ORAL
Qty: 90 TAB | Refills: 0 | Status: SHIPPED | OUTPATIENT
Start: 2018-02-14 | End: 2018-05-16 | Stop reason: SDUPTHER

## 2018-02-19 ENCOUNTER — OFFICE VISIT (OUTPATIENT)
Dept: FAMILY MEDICINE CLINIC | Age: 38
End: 2018-02-19

## 2018-02-19 VITALS
OXYGEN SATURATION: 97 % | DIASTOLIC BLOOD PRESSURE: 81 MMHG | RESPIRATION RATE: 20 BRPM | HEART RATE: 94 BPM | HEIGHT: 64 IN | BODY MASS INDEX: 33.29 KG/M2 | TEMPERATURE: 98.2 F | WEIGHT: 195 LBS | SYSTOLIC BLOOD PRESSURE: 119 MMHG

## 2018-02-19 DIAGNOSIS — R51.9 FACIAL PAIN: Primary | ICD-10-CM

## 2018-02-19 RX ORDER — BISMUTH SUBSALICYLATE 262 MG
1 TABLET,CHEWABLE ORAL DAILY
COMMUNITY

## 2018-02-19 RX ORDER — PREDNISONE 10 MG/1
TABLET ORAL
Qty: 1 PACKAGE | Refills: 0 | Status: SHIPPED | OUTPATIENT
Start: 2018-02-19 | End: 2018-09-26 | Stop reason: ALTCHOICE

## 2018-02-19 RX ORDER — CEPHALEXIN 500 MG/1
1000 CAPSULE ORAL 2 TIMES DAILY
Qty: 40 CAP | Refills: 0 | Status: SHIPPED | OUTPATIENT
Start: 2018-02-19 | End: 2018-03-01

## 2018-02-19 RX ORDER — IBUPROFEN 200 MG
TABLET ORAL
COMMUNITY

## 2018-02-19 NOTE — MR AVS SNAPSHOT
[address] [address] 
[phone] Patient: Angela Melvin MRN: [id number] HXX:[id number] Visit Information Date & Time Provider Department Dept. Phone Encounter #  
 2/19/2018  9:45 AM Tristan Baez MD [address] [facility] [phone] [id number] Follow-up Instructions Return if symptoms worsen or fail to improve. Upcoming Health Maintenance Date Due  
 PAP AKA CERVICAL CYTOLOGY 1/13/2017 Influenza Age 5 to Adult 8/1/2017 DTaP/Tdap/Td series (2 - Td) 8/26/2026 Allergies as of 2/19/2018  Review Complete On: 2/19/2018 By: Tristan Baez MD  
  
 Severity Noted Reaction Type Reactions Clindamycin High 10/17/2014    Anaphylaxis, Other (comments) \"lightheaded, disoriented, makes my legs hurts, and makes my tongue swell\" Acetaminophen  01/13/2014   Systemic Other (comments) Due to liver problems Current Immunizations  Reviewed on 10/23/2013 Name Date Influenza Vaccine (Quad) PF 10/23/2013 Tdap 8/26/2016 Not reviewed this visit You Were Diagnosed With   
  
 Codes Comments Facial pain    -  Primary ICD-10-CM: O00 ICD-9-CM: 495. 0 Vitals BP Pulse Temp Resp Height(growth percentile) Weight(growth percentile) 119/81 94 98.2 °F (36.8 °C) 20 5' 4\" (1.626 m) 195 lb (88.5 kg) LMP SpO2 BMI OB Status Smoking Status 01/29/2018 97% 33.47 kg/m2 Having regular periods Former Smoker Vitals History BMI and BSA Data Body Mass Index Body Surface Area  
 33.47 kg/m 2 2 m 2 Preferred Pharmacy Pharmacy Name Phone [facility]enn[facility] Estrada [address], [address] [phone] Your Updated Medication List  
  
   
This list is accurate as of: 2/19/18  9:48 AM.  Always use your most recent med list. ADVIL 200 mg tablet Generic drug:  ibuprofen Take  by mouth. buPROPion  mg XL tablet Commonly known as:  WELLBUTRIN XL  
TAKE ONE TABLET BY MOUTH EVERY MORNING  
  
 cephALEXin 500 mg capsule Commonly known as:  Hahira Guru Take 2 Caps by mouth two (2) times a day for 10 days. CLARITIN 10 mg tablet Generic drug:  loratadine Take 10 mg by mouth.  
  
 multivitamin tablet Commonly known as:  ONE A DAY Take 1 Tab by mouth daily. pantoprazole 40 mg tablet Commonly known as:  PROTONIX  
TAKE ONE TABLET BY MOUTH DAILY predniSONE 10 mg dose pack Commonly known as:  STERAPRED DS  
6 Day DS taper pack as directed Prescriptions Sent to Pharmacy Refills  
 predniSONE (STERAPRED DS) 10 mg dose pack 0 Si Day DS taper pack as directed Class: Normal  
 [facility]acy: [facility], [address] Ph #: [phone]  
 cephALEXin (KEFLEX) 500 mg capsule 0 Sig: Take 2 Caps by mouth two (2) times a day for 10 days. Class: Normal  
 Pharmacy: [facility] [address] [place] [address], [address] Ph #: [phone] Route: Oral  
  
Follow-up Instructions Return if symptoms worsen or fail to improve. Introducing [facility] & [facility] SERVICES! Dear Margie Mason: Thank you for requesting a Seam[employer] Receipts account. Our records indicate that you already have an active Seamless Receipts account. You can access your account anytime at [email or web address] Did you know that you can access your hospital and ER discharge instructions at any time in Seamless Receipts? You can also review all of your test results from your hospital stay or ER visit. Additional Information If you have questions, please visit the Frequently Asked Questions section of the Seamless Receipts website at [email or web address] Remember, Seamless Receipts is NOT to be used for urgent needs. For medical emergencies, dial 911. Now available from your iPhone and Android! Please provide this summary of care documentation to your next provider. Your primary care clinician is listed as Erven Klinefelter. If you have any questions after today's visit, please call [phone].

## 2018-02-19 NOTE — PROGRESS NOTES
Patient here for right sided facial pain, swelling x 2 days. She has been taking advil  For some relief but does not feel like it has gotten much better. She does states swelling has gotten somewhat better,  But pain is progressively getting worse. 1. Have you been to the ER, urgent care clinic since your last visit? Hospitalized since your last visit? No    2. Have you seen or consulted any other health care providers outside of the [address] [place] [address] since your last visit? Include any pap smears or colon screening. No     No tooth pain    No trauma      Chief Complaint   Patient presents with    Facial Pain     right facial pain, swelling since Friday     She is a 43 y.o. female who presents for evalution. Reviewed PmHx, RxHx, FmHx, SocHx, AllgHx and updated and dated in the chart. Patient Active Problem List    Diagnosis    Iron deficiency anemia    Depression    Anxiety    Migraine    Dysmenorrhea    Endometriosis       Review of Systems - negative except as listed above in the HPI    Objective:     Vitals:    02/19/18 0936   BP: 119/81   Pulse: 94   Resp: 20   Temp: 98.2 °F (36.8 °C)   SpO2: 97%   Weight: 195 lb (88.5 kg)   Height: 5' 4\" (1.626 m)     Physical Examination: General appearance - alert, well appearing, and in no distress  r buccal area swollen and tender, no tooth issues    Assessment/ Plan:   Diagnoses and all orders for this visit:    1. Facial pain  -     predniSONE (STERAPRED DS) 10 mg dose pack; 6 Day DS taper pack as directed  -     cephALEXin (KEFLEX) 500 mg capsule; Take 2 Caps by mouth two (2) times a day for 10 days. -? Parotid stone vs other       Follow-up Disposition:  Return if symptoms worsen or fail to improve. I have discussed the diagnosis with the patient and the intended plan as seen in the above orders. The patient understands and agrees with the plan.  The patient has received an after-visit summary and questions were answered concerning future plans.     Medication Side Effects and Warnings were discussed with patient  Patient Labs were reviewed and or requested:  Patient Past Records were reviewed and or requested    Deysi Khan M.D. There are no Patient Instructions on file for this visit.

## 2018-03-17 RX ORDER — BUPROPION HYDROCHLORIDE 300 MG/1
TABLET ORAL
Qty: 30 TAB | Refills: 2 | Status: SHIPPED | OUTPATIENT
Start: 2018-03-17 | End: 2018-06-25 | Stop reason: SDUPTHER

## 2018-05-16 DIAGNOSIS — K21.9 GASTROESOPHAGEAL REFLUX DISEASE WITHOUT ESOPHAGITIS: ICD-10-CM

## 2018-05-16 RX ORDER — PANTOPRAZOLE SODIUM 40 MG/1
TABLET, DELAYED RELEASE ORAL
Qty: 90 TAB | Refills: 0 | Status: SHIPPED | OUTPATIENT
Start: 2018-05-16 | End: 2018-08-24 | Stop reason: SDUPTHER

## 2018-06-25 ENCOUNTER — TELEPHONE (OUTPATIENT)
Dept: FAMILY MEDICINE CLINIC | Age: 38
End: 2018-06-25

## 2018-06-25 RX ORDER — SUMATRIPTAN 100 MG/1
100 TABLET, FILM COATED ORAL
Qty: 9 TAB | Refills: 2 | Status: SHIPPED | OUTPATIENT
Start: 2018-06-25 | End: 2018-06-25

## 2018-06-25 RX ORDER — BUPROPION HYDROCHLORIDE 300 MG/1
TABLET ORAL
Qty: 30 TAB | Refills: 1 | Status: SHIPPED | OUTPATIENT
Start: 2018-06-25 | End: 2018-10-07 | Stop reason: SDUPTHER

## 2018-06-26 NOTE — TELEPHONE ENCOUNTER
----- Message from [address] Short sent at 6/22/2018  1:38 PM EDT -----  Regarding: Prescription Question  Contact: [phone]  Hi! I was wondering if I could get a refill of my migraine meds (sumatriptan) sent to my pharmacy [phone])? I didnt see it listed as an option under refill requests. Thank you!

## 2018-08-24 DIAGNOSIS — K21.9 GASTROESOPHAGEAL REFLUX DISEASE WITHOUT ESOPHAGITIS: ICD-10-CM

## 2018-08-24 RX ORDER — PANTOPRAZOLE SODIUM 40 MG/1
TABLET, DELAYED RELEASE ORAL
Qty: 90 TAB | Refills: 0 | Status: SHIPPED | OUTPATIENT
Start: 2018-08-24 | End: 2019-01-31 | Stop reason: SDUPTHER

## 2018-09-26 ENCOUNTER — OFFICE VISIT (OUTPATIENT)
Dept: FAMILY MEDICINE CLINIC | Age: 38
End: 2018-09-26

## 2018-09-26 VITALS
TEMPERATURE: 98.5 F | OXYGEN SATURATION: 98 % | WEIGHT: 196 LBS | DIASTOLIC BLOOD PRESSURE: 74 MMHG | SYSTOLIC BLOOD PRESSURE: 106 MMHG | BODY MASS INDEX: 33.46 KG/M2 | HEART RATE: 66 BPM | RESPIRATION RATE: 17 BRPM | HEIGHT: 64 IN

## 2018-09-26 DIAGNOSIS — Z00.00 WELL ADULT EXAM: Primary | ICD-10-CM

## 2018-09-26 RX ORDER — SUMATRIPTAN 100 MG/1
100 TABLET, FILM COATED ORAL
COMMUNITY

## 2018-09-26 RX ORDER — CETIRIZINE HCL 10 MG
TABLET ORAL
COMMUNITY

## 2018-09-26 RX ORDER — MONTELUKAST SODIUM 10 MG/1
10 TABLET ORAL DAILY
COMMUNITY

## 2018-09-26 NOTE — PROGRESS NOTES
Subjective:   43 y.o. female for Well Woman Check. Her gyne and breast care is done elsewhere by her Ob-Gyne physician. Patient Active Problem List    Diagnosis Date Noted    Iron deficiency anemia 08/30/2016    Depression 03/18/2013    Anxiety 01/19/2011    Migraine 03/15/2010    Dysmenorrhea 03/15/2010    Endometriosis 03/15/2010     Current Outpatient Prescriptions   Medication Sig Dispense Refill    montelukast (SINGULAIR) 10 mg tablet Take 10 mg by mouth daily.  fluticasone propionate (FLONASE NA) by Nasal route.  cetirizine (ZYRTEC) 10 mg tablet Take  by mouth.  SUMAtriptan (IMITREX) 100 mg tablet Take 100 mg by mouth once as needed for Migraine.  pantoprazole (PROTONIX) 40 mg tablet TAKE ONE TABLET BY MOUTH DAILY 90 Tab 0    buPROPion XL (WELLBUTRIN XL) 300 mg XL tablet TAKE ONE TABLET BY MOUTH EVERY MORNING 30 Tab 1    multivitamin (ONE A DAY) tablet Take 1 Tab by mouth daily.  ibuprofen (ADVIL) 200 mg tablet Take  by mouth.  loratadine (CLARITIN) 10 mg tablet Take 10 mg by mouth. Family History   Problem Relation Age of Onset    Hypertension Father     Hypertension Brother     Heart Disease Maternal Grandfather      Social History   Substance Use Topics    Smoking status: Former Smoker     Packs/day: 0.25     Years: 20.00     Quit date: 3/21/2014    Smokeless tobacco: Never Used    Alcohol use 0.5 oz/week     1 Shots of liquor per week      Comment: rarely             ROS: Feeling generally well. No TIA's or unusual headaches, no dysphagia. No prolonged cough. No dyspnea or chest pain on exertion. No abdominal pain, change in bowel habits, black or bloody stools. No urinary tract symptoms. No new or unusual musculoskeletal symptoms. Specific concerns today: none. Objective: The patient appears well, alert, oriented x 3, in no distress.   Visit Vitals    /74    Pulse 66    Temp 98.5 °F (36.9 °C)    Resp 17    Ht 5' 4\" (1.626 m)  Wt 196 lb (88.9 kg)    LMP 08/29/2018 (Approximate)    SpO2 98%    BMI 33.64 kg/m2     ENT normal.  Neck supple. No adenopathy or thyromegaly. MELISSA. Lungs are clear, good air entry, no wheezes, rhonchi or rales. S1 and S2 normal, no murmurs, regular rate and rhythm. Abdomen soft without tenderness, guarding, mass or organomegaly. Extremities show no edema, normal peripheral pulses. Neurological is normal, no focal findings. Breast and Pelvic exams are deferred. Assessment/Plan:   Well Woman  lose weight, increase physical activity, follow low fat diet, follow low salt diet, routine labs ordered  Encounter Diagnoses   Name Primary?  Well adult exam Yes     Orders Placed This Encounter    CBC WITH AUTOMATED DIFF    LIPID PANEL    METABOLIC PANEL, COMPREHENSIVE    TSH 3RD GENERATION    montelukast (SINGULAIR) 10 mg tablet    fluticasone propionate (FLONASE NA)    cetirizine (ZYRTEC) 10 mg tablet    SUMAtriptan (IMITREX) 100 mg tablet     Labs updated today  Follow up pending results    I have discussed the diagnosis with the patient and the intended plan as seen in the above orders. The patient has received an after-visit summary and questions were answered concerning future plans. Patient conveyed understanding of the plan at the time of the visit.     Maritza Patterson, MSN, ANP  9/26/2018

## 2018-09-26 NOTE — MR AVS SNAPSHOT
[address] [address] 
[phone] Patient: Bertrand Wang MRN: [id number] Visit Information Date & Time Provider Department Dept. Phone Encounter #  
 9/26/2018 10:30 AM Betty Michel NP [facility] [phone] [id number] Your Appointments 10/12/2018 10:30 AM  
PHYSICAL PRE OP with Supriya Coleman NP [facility] ([facility] Appt Note: cpe with PAP  
 [address] [id number] [phone]  
  
   
 [address] [address] Upcoming Health Maintenance Date Due  
 PAP AKA CERVICAL CYTOLOGY 1/13/2017 Influenza Age 5 to Adult 8/1/2018 DTaP/Tdap/Td series (2 - Td) 8/26/2026 Allergies as of 9/26/2018  Review Complete On: 9/26/2018 By: Gianfranco Soto LPN Severity Noted Reaction Type Reactions Clindamycin High 10/17/2014    Anaphylaxis, Other (comments) \"lightheaded, disoriented, makes my legs hurts, and makes my tongue swell\" Acetaminophen  01/13/2014   Systemic Other (comments) Due to liver problems Current Immunizations  Reviewed on 10/23/2013 Name Date Influenza Vaccine (Quad) PF 10/23/2013 Tdap 8/26/2016 Not reviewed this visit You Were Diagnosed With   
  
 Codes Comments Well adult exam    -  Primary ICD-10-CM: Z00.00 ICD-9-CM: V70.0 Vitals BP Pulse Temp Resp Height(growth percentile) Weight(growth percentile) 106/74 66 98.5 °F (36.9 °C) 17 5' 4\" (1.626 m) 196 lb (88.9 kg) LMP SpO2 BMI OB Status Smoking Status 08/29/2018 (Approximate) 98% 33.64 kg/m2 Unknown Former Smoker Vitals History BMI and BSA Data Body Mass Index Body Surface Area  
 33.64 kg/m 2 2 m 2 Preferred Pharmacy Pharmacy Name Phone [facility] [address], [address] [phone] Your Updated Medication List  
  
 This list is accurate as of 9/26/18 11:22 AM.  Always use your most recent med list. ADVIL 200 mg tablet Generic drug:  ibuprofen Take  by mouth. buPROPion  mg XL tablet Commonly known as:  WELLBUTRIN XL  
TAKE ONE TABLET BY MOUTH EVERY MORNING  
  
 cetirizine 10 mg tablet Commonly known as:  ZYRTEC Take  by mouth. CLARITIN 10 mg tablet Generic drug:  loratadine Take 10 mg by mouth. FLONASE NA  
by Nasal route. IMITREX 100 mg tablet Generic drug:  SUMAtriptan Take 100 mg by mouth once as needed for Migraine. multivitamin tablet Commonly known as:  ONE A DAY Take 1 Tab by mouth daily. pantoprazole 40 mg tablet Commonly known as:  PROTONIX  
TAKE ONE TABLET BY MOUTH DAILY  
  
 SINGULAIR 10 mg tablet Generic drug:  montelukast  
Take 10 mg by mouth daily. We Performed the Following CBC WITH AUTOMATED DIFF [76191 CPT(R)] LIPID PANEL [66781 CPT(R)] METABOLIC PANEL, COMPREHENSIVE [25228 CPT(R)] TSH 3RD GENERATION [36944 CPT(R)] Introducing [facility] [place] [facility] SERVICES! Dear Anastacio Glover: Thank you for requesting a zeenworld account. Our records indicate that you already have an active zeenworld account. You can access your account anytime at [email or web address] Did you know that you can access your hospital and ER discharge instructions at any time in zeenworld? You can also review all of your test results from your hospital stay or ER visit. Additional Information If you have questions, please visit the Frequently Asked Questions section of the zeenworld website at [email or web address] Remember, zeenworld is NOT to be used for urgent needs. For medical emergencies, dial 911. Now available from your iPhone and Android! Please provide this summary of care documentation to your next provider. Your primary care clinician is listed as Kacy Kaplan. If you have any questions after today's visit, please call [phone].

## 2018-09-26 NOTE — PROGRESS NOTES
Chief Complaint   Patient presents with    Labs      1. Have you been to the ER, urgent care clinic since your last visit? Hospitalized since your last visit? No    2. Have you seen or consulted any other health care providers outside of the [address] [place] [address] since your last visit? Include any pap smears or colon screening.  No

## 2018-09-27 LAB
ALBUMIN SERPL-MCNC: 4.5 G/DL (ref 3.5–5.5)
ALBUMIN/GLOB SERPL: 1.7 {RATIO} (ref 1.2–2.2)
ALP SERPL-CCNC: 52 IU/L (ref 39–117)
ALT SERPL-CCNC: 27 IU/L (ref 0–32)
AST SERPL-CCNC: 26 IU/L (ref 0–40)
BASOPHILS # BLD AUTO: 0 X10E3/UL (ref 0–0.2)
BASOPHILS NFR BLD AUTO: 0 %
BILIRUB SERPL-MCNC: <0.2 MG/DL (ref 0–1.2)
BUN SERPL-MCNC: 14 MG/DL (ref 6–20)
BUN/CREAT SERPL: 16 (ref 9–23)
CALCIUM SERPL-MCNC: 9.4 MG/DL (ref 8.7–10.2)
CHLORIDE SERPL-SCNC: 106 MMOL/L (ref 96–106)
CHOLEST SERPL-MCNC: 175 MG/DL (ref 100–199)
CO2 SERPL-SCNC: 22 MMOL/L (ref 20–29)
CREAT SERPL-MCNC: 0.86 MG/DL (ref 0.57–1)
EOSINOPHIL # BLD AUTO: 0.2 X10E3/UL (ref 0–0.4)
EOSINOPHIL NFR BLD AUTO: 2 %
ERYTHROCYTE [DISTWIDTH] IN BLOOD BY AUTOMATED COUNT: 18.8 % (ref 12.3–15.4)
GLOBULIN SER CALC-MCNC: 2.6 G/DL (ref 1.5–4.5)
GLUCOSE SERPL-MCNC: 85 MG/DL (ref 65–99)
HCT VFR BLD AUTO: 34.8 % (ref 34–46.6)
HDLC SERPL-MCNC: 56 MG/DL
HGB BLD-MCNC: 10.8 G/DL (ref 11.1–15.9)
IMM GRANULOCYTES # BLD: 0 X10E3/UL (ref 0–0.1)
IMM GRANULOCYTES NFR BLD: 0 %
INTERPRETATION, 910389: NORMAL
LDLC SERPL CALC-MCNC: 105 MG/DL (ref 0–99)
LYMPHOCYTES # BLD AUTO: 2.7 X10E3/UL (ref 0.7–3.1)
LYMPHOCYTES NFR BLD AUTO: 34 %
MCH RBC QN AUTO: 22.4 PG (ref 26.6–33)
MCHC RBC AUTO-ENTMCNC: 31 G/DL (ref 31.5–35.7)
MCV RBC AUTO: 72 FL (ref 79–97)
MONOCYTES # BLD AUTO: 0.6 X10E3/UL (ref 0.1–0.9)
MONOCYTES NFR BLD AUTO: 8 %
NEUTROPHILS # BLD AUTO: 4.3 X10E3/UL (ref 1.4–7)
NEUTROPHILS NFR BLD AUTO: 56 %
PLATELET # BLD AUTO: 363 X10E3/UL (ref 150–379)
POTASSIUM SERPL-SCNC: 4.9 MMOL/L (ref 3.5–5.2)
PROT SERPL-MCNC: 7.1 G/DL (ref 6–8.5)
RBC # BLD AUTO: 4.82 X10E6/UL (ref 3.77–5.28)
SODIUM SERPL-SCNC: 143 MMOL/L (ref 134–144)
TRIGL SERPL-MCNC: 70 MG/DL (ref 0–149)
TSH SERPL DL<=0.005 MIU/L-ACNC: 0.87 UIU/ML (ref 0.45–4.5)
VLDLC SERPL CALC-MCNC: 14 MG/DL (ref 5–40)
WBC # BLD AUTO: 7.8 X10E3/UL (ref 3.4–10.8)

## 2018-10-18 ENCOUNTER — OFFICE VISIT (OUTPATIENT)
Dept: FAMILY MEDICINE CLINIC | Age: 38
End: 2018-10-18

## 2018-10-18 VITALS
HEIGHT: 64 IN | RESPIRATION RATE: 18 BRPM | HEART RATE: 65 BPM | SYSTOLIC BLOOD PRESSURE: 122 MMHG | OXYGEN SATURATION: 98 % | TEMPERATURE: 98.4 F | WEIGHT: 199 LBS | BODY MASS INDEX: 33.97 KG/M2 | DIASTOLIC BLOOD PRESSURE: 85 MMHG

## 2018-10-18 DIAGNOSIS — Z01.419 ENCOUNTER FOR ANNUAL ROUTINE GYNECOLOGICAL EXAMINATION: Primary | ICD-10-CM

## 2018-10-18 DIAGNOSIS — Z12.4 SCREENING FOR MALIGNANT NEOPLASM OF CERVIX: ICD-10-CM

## 2018-10-18 DIAGNOSIS — Z23 ENCOUNTER FOR IMMUNIZATION: ICD-10-CM

## 2018-10-18 NOTE — PATIENT INSTRUCTIONS
Try eating a high protein breakfast. I recommend EGGS since they are high in protein. Try eating 1 egg with 2 egg whites. That should keep you feeling nice and full all morning. You could also try nonfat greek yogurt with a small amount of the trail mix or granola. I recommend eating an apple as a mid morning snack. You can add a little bit of peanut butter or almond butter (less sweet option) for the extra protein. For lunch I recommend a protein like fish, chicken, turkey breast, etc. If you are going to have a sandwich try whole grain bread and start with half a sandwich to see if that satiates you. I think wraps (like spinach wraps) are better. You can put as many veggies on it as you want like onion, tomato, lettuce. Try mustard instead of woo because woo is high in fat. If you need a crunchy/salty side try pop chips or hansel chips. They are less fattening that potato chips. For a mid afternoon snack I recommend carrots with humus. Humus is so good for you and so filling due to the high protein. For dinner stick with the protein like fish, chicken or turkey. For starch I recommend quinoa, brown rice, sweet potato, or cous cous. Always eat a vegetable with your dinner. Spinach and kale are great because they fill you up and are full of vitamins and minerals. I know that its difficult to stop craving sweets in the evening. For this I recommend purple or green grapes because they will curb your craving. Pineapple is another delicious alternative. Another idea is any of those \"100 calorie\" desserts like skinny cow. Just make sure you only have one. Drink as much water as you possibly can all day long. Try adding fresh slices of lemon to curb cravings. Also purchase dany seeds. If you sprinkle them on your food, they \"blow up\" in the stomach and make you feel [place] faster.  I usually put in on yogurt or peanut butter toast. You can really put it on anything and you can find them pretty much everywhere these days. You should join [employer] at www.[employer]ara[employer]ia.com. They have an endless supply of healthy recipes. Get a fit bit to track your steps. Try getting 10,000 steps per day. I recommend the [employer].

## 2018-10-18 NOTE — PROGRESS NOTES
Chief Complaint   Patient presents with   Dino Loyola     Patient in office today for cpe and gyn exam.    Fasting labs were completed couple wks ago. Health Maintenance Due   Topic Date Due    PAP AKA CERVICAL CYTOLOGY  01/13/2017    Influenza Age 5 to Adult  08/01/2018     Denies any vaginal discharge. Denies any urinary sx. Periods are normal for patient. Denies any history of abnormal pap smear. Denies any other concerns at this time. Chief Complaint   Patient presents with   Dino Loyola     she is a 43y.o. year old female who presents for evalution. Reviewed PmHx, RxHx, FmHx, SocHx, AllgHx and updated and dated in the chart. Review of Systems - negative except as listed above in the HPI    Objective:     Vitals:    10/18/18 1028   BP: 122/85   Pulse: 65   Resp: 18   Temp: 98.4 °F (36.9 °C)   TempSrc: Oral   SpO2: 98%   Weight: 199 lb (90.3 kg)   Height: 5' 4\" (1.626 m)     Physical Examination: General appearance - alert, well appearing, and in no distress  Chest - clear to auscultation, no wheezes, rales or rhonchi, symmetric air entry  Heart - normal rate, regular rhythm, normal S1, S2, no murmurs  Pelvic exam: VULVA: normal appearing vulva with no masses, tenderness or lesions, VAGINA: normal appearing vagina with normal color and discharge, no lesions, CERVIX: normal appearing cervix without discharge or lesions. Assessment/ Plan:   Diagnoses and all orders for this visit:    Encounter for annual routine gynecological examination  -     PAP IG, RFX APTIMA HPV ASCUS ([id number]))    Screening for malignant neoplasm of cervix  -     PAP IG, RFX APTIMA HPV ASCUS ([id number]))  Will notify results and deviate plan based on findings. Encounter for immunization  -     INFLUENZA VIRUS VAC QUAD,SPLIT,PRESV FREE SYRINGE IM  Given. Follow-up Disposition:  Return if symptoms worsen or fail to improve.     I have discussed the diagnosis with the patient and the intended plan as seen in the above orders. The patient has received an after-visit summary and questions were answered concerning future plans. Medication Side Effects and Warnings were discussed with patient: yes  Patient Labs were reviewed and or requested: yes  Patient Past Records were reviewed and or requested  yes  Patient / Caregiver Understanding of treatment plan was verbalized during office visit ALYCIA Rust    Patient Instructions   Try eating a high protein breakfast. I recommend EGGS since they are high in protein. Try eating 1 egg with 2 egg whites. That should keep you feeling nice and full all morning. You could also try nonfat greek yogurt with a small amount of the trail mix or granola. I recommend eating an apple as a mid morning snack. You can add a little bit of peanut butter or almond butter (less sweet option) for the extra protein. For lunch I recommend a protein like fish, chicken, turkey breast, etc. If you are going to have a sandwich try whole grain bread and start with half a sandwich to see if that satiates you. I think wraps (like spinach wraps) are better. You can put as many veggies on it as you want like onion, tomato, lettuce. Try mustard instead of woo because woo is high in fat. If you need a crunchy/salty side try pop chips or hansel chips. They are less fattening that potato chips. For a mid afternoon snack I recommend carrots with humus. Humus is so good for you and so filling due to the high protein. For dinner stick with the protein like fish, chicken or turkey. For starch I recommend quinoa, brown rice, sweet potato, or cous cous. Always eat a vegetable with your dinner. Spinach and kale are great because they fill you up and are full of vitamins and minerals. I know that its difficult to stop craving sweets in the evening. For this I recommend purple or green grapes because they will curb your craving.  Pineapple is another delicious alternative. Another idea is any of those \"100 calorie\" desserts like skinny cow. Just make sure you only have one. Drink as much water as you possibly can all day long. Try adding fresh slices of lemon to curb cravings. Also purchase dany seeds. If you sprinkle them on your food, they \"blow up\" in the stomach and make you feel [place] faster. I usually put in on yogurt or peanut butter toast. You can really put it on anything and you can find them pretty much everywhere these days. You should join [employer] at www.pinterest.com. They have an endless supply of healthy recipes. Get a fit bit to track your steps. Try getting 10,000 steps per day. I recommend the [employer].

## 2018-10-18 NOTE — PROGRESS NOTES
Chief Complaint   Patient presents with   Dino Loyola     Patient in office today for cpe and gyn exam.  Fasting labs were completed couple wks ago.

## 2018-10-23 LAB
CYTOLOGIST CVX/VAG CYTO: NORMAL
CYTOLOGY CVX/VAG DOC THIN PREP: NORMAL
DX ICD CODE: NORMAL
LABCORP, 190119: NORMAL
Lab: NORMAL
Lab: NORMAL
OTHER STN SPEC: NORMAL
PATH REPORT.FINAL DX SPEC: NORMAL
STAT OF ADQ CVX/VAG CYTO-IMP: NORMAL

## 2018-10-25 NOTE — PROGRESS NOTES
The following message was sent to pt via [employer] portal in reference to lab results:    Good evening Necol,     Attached are the results of your pap smear. It is normal showing no atypical cells. I recommend we repeat a pap smear in 3 years. Please let me know if you have any questions or concerns regarding these results.    Venita Boswell, BRANDYNP-C

## 2018-11-08 DIAGNOSIS — G43.709 CHRONIC MIGRAINE WITHOUT AURA WITHOUT STATUS MIGRAINOSUS, NOT INTRACTABLE: Primary | ICD-10-CM

## 2018-12-05 ENCOUNTER — DOCUMENTATION ONLY (OUTPATIENT)
Dept: NEUROLOGY | Age: 38
End: 2018-12-05

## 2018-12-05 ENCOUNTER — OFFICE VISIT (OUTPATIENT)
Dept: NEUROLOGY | Age: 38
End: 2018-12-05

## 2018-12-05 VITALS
HEART RATE: 66 BPM | HEIGHT: 64 IN | BODY MASS INDEX: 34.66 KG/M2 | OXYGEN SATURATION: 97 % | WEIGHT: 203 LBS | SYSTOLIC BLOOD PRESSURE: 130 MMHG | DIASTOLIC BLOOD PRESSURE: 90 MMHG

## 2018-12-05 DIAGNOSIS — G43.019 INTRACTABLE MIGRAINE WITHOUT AURA AND WITHOUT STATUS MIGRAINOSUS: Primary | ICD-10-CM

## 2018-12-05 NOTE — PROGRESS NOTES
Pt states she want new preventative injectable   medication    Pt states she has migraine  twice a month; couple days a week.  Last one is last night 12/4/18

## 2018-12-05 NOTE — PROGRESS NOTES
NEUROLOGY NEW PATIENT OFFICE CONSULTATION      2018    RE: Юлия Glynn         1980      REFERRED BY:  Krystina Jennings NP        CHIEF COMPLAINT:  This is Katy Orona is a 43 y.o. female right handed  who had concerns including Migraine. HPI:     Since teenage yrs, patient noted headaches R temple, 8/10, dull aching, lasting 2 days, occurring 6/ month, triggered by menstrual cycle(+) nausea (+) vomiting (+) photophobia (+) phonophobia (-) blurred vision. Tried Imitrex which helps, Exedrin did not help. Patient saw Dr Ayala Mena in . Patient was part of the study with new CGRP meds (Eptinezumab IV infusion every 3 months) with benefit and so she is interested to be placed on this type of medication. Patient is concern about taking medications that can make her drowsy and tired and affect her BP and prefers to try the new medications. ROS   (-) fever  (-) rash  All other systems reviewed and are negative    Past Medical Hx  Past Medical History:   Diagnosis Date    Coagulation disorder ([address]     anemic    Dysmenorrhea 3/15/2010    Endometriosis 3/15/2010    GERD (gastroesophageal reflux disease)     Ill-defined condition     depression    Migraine 3/15/2010    Ulcer 2012   R knee surgery    Social Hx  Social History     Socioeconomic History    Marital status:      Spouse name: Not on file    Number of children: Not on file    Years of education: Not on file    Highest education level: Not on file   Tobacco Use    Smoking status: Former Smoker     Packs/day: 0.25     Years: 20.00     Pack years: 5.00     Last attempt to quit: 3/21/2014     Years since quittin.7    Smokeless tobacco: Never Used   Substance and Sexual Activity    Alcohol use:  Yes     Alcohol/week: 0.5 oz     Types: 1 Shots of liquor per week     Comment: rarely    Drug use: No    Sexual activity: Yes     Partners: Male     Birth control/protection: Surgical     Comment: tubal ligation       Family Hx  Family History   Problem Relation Age of Onset    Hypertension Father     Hypertension Brother     Heart Disease Maternal Grandfather        ALLERGIES  Allergies   Allergen Reactions    Clindamycin Anaphylaxis and Other (comments)     \"lightheaded, disoriented, makes my legs hurts, and makes my tongue swell\"    Acetaminophen Other (comments)     Due to liver problems       CURRENT MEDS  Current Outpatient Medications   Medication Sig Dispense Refill    buPROPion XL (WELLBUTRIN XL) 300 mg XL tablet TAKE ONE TABLET BY MOUTH EVERY MORNING 30 Tab 5    montelukast (SINGULAIR) 10 mg tablet Take 10 mg by mouth daily.  fluticasone propionate (FLONASE NA) by Nasal route.  cetirizine (ZYRTEC) 10 mg tablet Take  by mouth.  SUMAtriptan (IMITREX) 100 mg tablet Take 100 mg by mouth once as needed for Migraine.  pantoprazole (PROTONIX) 40 mg tablet TAKE ONE TABLET BY MOUTH DAILY 90 Tab 0    multivitamin (ONE A DAY) tablet Take 1 Tab by mouth daily.  ibuprofen (ADVIL) 200 mg tablet Take  by mouth.  loratadine (CLARITIN) 10 mg tablet Take 10 mg by mouth. PREVIOUS WORKUP: (reviewed)  IMAGING:    CT Results (recent):  Results from Hospital Encounter encounter on 02/19/13   CT HEAD WO CONT    Narrative **Final Report**       ICD Codes / Adm. Diagnosis: 780.60  52 / Fever  Headache  Examination:  CT HEAD WO CON  - [id number] - Feb 19 2013  7:46PM  Accession No:  [id number]  Reason:  HA      REPORT:  Indication:  HA    Comparison: None    Findings: 5 mm axial images were obtained from the skull base through the   vertex. The ventricles and cortical sulci are appropriate in size and   configuration. There is no evidence of intracranial hemorrhage, mass, mass   effect, or acute infarct. No extra-axial fluid collections are seen. The   visualized paranasal sinuses and mastoid air cells are clear. The orbital   structures are unremarkable.  No osseous abnormalities are seen.        IMPRESSION: Normal head CT. Signing/Reading Doctor: Arina August ([id number])    ApprovedUnabridger Frederick ([id number])  Feb 19 2013  7:49PM                                      MRI Results (recent):  Results from Hospital Encounter encounter on 03/07/12   MRI ABDOMEN W MRCP W/O CONTRAST    Narrative **Final Report**       ICD Codes / Adm. Diagnosis: 789.00   / Caterina Diaz  Examination:  MR ABDOMEN MRCP  CON  - [id number] - Mar  7 2012  7:53PM  Accession No:  [id number]  Reason:  rule out common bile duct stone      REPORT:  INDICATION:    rule out common bile duct stone , cholecystectomy, clinical   biliary dyskinesia    COMPARISON: None. TECHNIQUE:   MR of the abdomen was performed and the following sequences were obtained:   Coronal HASTE, multiplanar MRCP, axial in and out-of-phase T1, axial T1   fat-saturated, axial T2, and pre-  contrast T1-weighted images   FINDINGS:  The liver is normal.    The spleen is normal.  The pancreas is normal.  The   adrenal glands are normal.  The kidneys are normal. The left kidney contains   a 8mm bilobed cyst.    There is no intra or extrahepatic biliary ductal dilatation. No biliary   strictures or filling defects are seen. There is no pancreas divisum. There is no lymphadenopathy or ascites. IMPRESSION:      No evidence for a retained common bile duct stone. Simple left renal cyst.            Signing/Reading Doctor: Dariel Ram ([id number])    Approved: Dariel Ram ([id number])  03/08/2012                                      IR Results (recent):  No results found for this or any previous visit. VAS/US Results (recent):  No results found for this or any previous visit.         LABS (reviewed)  Results for orders placed or performed in visit on 10/18/18   PAP IG, RFX APTIMA HPV ASCUS ([id number]))   Result Value Ref Range    Diagnosis Comment     Specimen adequacy Comment     Clinician provided ICD10 Comment     Performed by: Comment QC reviewed by: Comment     . Tez Gregory Note: Comment     Test methodology Comment     . Comment        Physical Exam:     Visit Vitals  /90 (BP 1 Location: Right arm, BP Patient Position: Sitting)   Pulse 66   Ht 5' 4\" (1.626 m)   Wt 92.1 kg (203 lb)   LMP 11/28/2018   SpO2 97%   BMI 34.84 kg/m²     General:  Alert, cooperative, no distress. Head:  Normocephalic, without obvious abnormality, atraumatic. Eyes:  Conjunctivae/corneas clear. Lungs:  Heart:   Non labored breathing  Regular rate and rhythm, no carotid bruits   Abdomen:   Soft, non-distended   Extremities: Extremities normal, atraumatic, no cyanosis or edema. Pulses: 2+ and symmetric all extremities. Skin: Skin color, texture, turgor normal. No rashes or lesions. Neurologic Exam     Gen: Attention normal             Language: naming, repetition, fluency normal             Memory: intact recent and remote memory  Cranial Nerves:  I: smell Not tested   II: visual fields Full to confrontation   II: pupils Equal, round, reactive to light   II: optic disc No papilledema   III,VII: ptosis none   III,IV,VI: extraocular muscles  Full ROM   V: mastication normal   V: facial light touch sensation  normal   VII: facial muscle function   symmetric   VIII: hearing symmetric   IX: soft palate elevation  normal   XI: trapezius strength  5/5   XI: sternocleidomastoid strength 5/5   XI: neck flexion strength  5/5   XII: tongue  midline     Motor: normal bulk and tone, no tremor              Strength: 5/5 all four extremities  Sensory: intact to LT, PP, vibration, and JPS  Reflexes: 2+ throughout; Down going toes  Coordination: Good FTN and HTS  Gait: normal gait including tandem            Impression:     Katy Orona is a 43 y.o. female who  has a past medical history of Coagulation disorder ([address] Dysmenorrhea, Endometriosis, GERD (gastroesophageal reflux disease), Ill-defined condition, Migraine, and Ulcer.  who since teenage yrs, noted severe headaches R temple, 8/10, dull aching, lasting 2 days, occurring 6/ month, triggered by menstrual cycle(+) nausea (+) vomiting (+) photophobia (+) phonophobia (-) blurred vision. Tried Imitrex which helps, Exedrin did not help. Patient was part of the study with new CGRP meds (Eptinezumab IV infusion every 3 months) with benefit and so she is interested to be placed on this type of medication. Patient is a  and is concern about taking medications that can make her drowsy, tired and affect her BP. RECOMMENDATIONS  1. I had a long discussion with patient. Discussed diagnosis, prognosis, pathophysiology and available treatment. Reviewed test results. All questions were answered. 2. Discussed that because she is a  and is concern about taking medications that can make her drowsy, tired and affect her BP, will try her on Aimovig 70 mg SC Q monthly as migraine prophylaxis  3. Given samples of Cambia and Zembrace to try to abort headaches. Otherwise, can continue Imitrex prn  4. Discussed the need for headache diary and went through the list that can trigger headache (stress, caffeine, menstrual cycle, cheese)      I spent total of 60 mins with the patient, greater than 50% of the visit was spent on providing counseling and coordination of care. Follow-up Disposition:  Return in about 8 weeks (around 1/30/2019).       Thank you for the consultation      Brody Block MD  Diplomate, American Board of Psychiatry and Neurology  Diplomate, Neuromuscular Medicine  Diplomate, American Board of Electrodiagnostic Medicine        CC: Lewis Hutchins NP  Fax: [phone]

## 2018-12-05 NOTE — TELEPHONE ENCOUNTER
Patients  came to [place] office to  aimovig sample.      Yareli Tsang  [id number]  8/20    Sample entry and scripted pended to provider to review and sign

## 2018-12-05 NOTE — LETTER
12/5/2018 11:47 AM 
 
Patient:  Isa Mckeon YOB: 1980 Date of Visit: 12/5/2018 Dear Brandt Jon, NP 
[id number] [place] [address] VIA In Basket 
 : Thank you for referring Ms. Katy Orona to me for evaluation/treatment. Below are the relevant portions of my assessment and plan of care. If you have questions, please do not hesitate to call me. I look forward to following Ms. Orona along with you. Sincerely, Nato Sanchez MD

## 2018-12-19 ENCOUNTER — OFFICE VISIT (OUTPATIENT)
Dept: FAMILY MEDICINE CLINIC | Age: 38
End: 2018-12-19

## 2018-12-19 VITALS
HEIGHT: 64 IN | BODY MASS INDEX: 34.31 KG/M2 | HEART RATE: 92 BPM | WEIGHT: 201 LBS | TEMPERATURE: 98.1 F | OXYGEN SATURATION: 97 % | SYSTOLIC BLOOD PRESSURE: 128 MMHG | RESPIRATION RATE: 16 BRPM | DIASTOLIC BLOOD PRESSURE: 90 MMHG

## 2018-12-19 DIAGNOSIS — J20.9 ACUTE BRONCHITIS, UNSPECIFIED ORGANISM: ICD-10-CM

## 2018-12-19 DIAGNOSIS — J01.00 ACUTE NON-RECURRENT MAXILLARY SINUSITIS: Primary | ICD-10-CM

## 2018-12-19 RX ORDER — AMOXICILLIN AND CLAVULANATE POTASSIUM 875; 125 MG/1; MG/1
1 TABLET, FILM COATED ORAL EVERY 12 HOURS
Qty: 20 TAB | Refills: 0 | Status: SHIPPED | OUTPATIENT
Start: 2018-12-19 | End: 2018-12-29

## 2018-12-19 RX ORDER — BENZONATATE 200 MG/1
200 CAPSULE ORAL
Qty: 30 CAP | Refills: 1 | Status: SHIPPED | OUTPATIENT
Start: 2018-12-19

## 2018-12-19 NOTE — PROGRESS NOTES
Chief Complaint   Patient presents with    Cough    Sore Throat     Patient presents in office today with c/o cough and sore throat. States that she was seen on Saturday at [facility] at tested positive for strep. Was sent home with azithromycin. Her last dose was this morning and she is not feeling any better. No other concerns. 1. Have you been to the ER, urgent care clinic since your last visit? Hospitalized since your last visit? Yes 12/15/18 at [facility] for strep     2. Have you seen or consulted any other health care providers outside of the [address] [place] [address] since your last visit? Include any pap smears or colon screening.  No    Learning Assessment 2/19/2018   PRIMARY LEARNER Patient   HIGHEST LEVEL OF EDUCATION - PRIMARY LEARNER  SOME COLLEGE   BARRIERS PRIMARY LEARNER NONE   CO-LEARNER CAREGIVER No   PRIMARY LANGUAGE ENGLISH   LEARNER PREFERENCE PRIMARY DEMONSTRATION   ANSWERED BY pat   RELATIONSHIP SELF

## 2018-12-19 NOTE — PATIENT INSTRUCTIONS
Bronchitis: Care Instructions  Your Care Instructions    Bronchitis is inflammation of the bronchial tubes, which carry air to the lungs. The tubes swell and produce mucus, or phlegm. The mucus and inflamed bronchial tubes make you cough. You may have trouble breathing. Most cases of bronchitis are caused by viruses like those that cause colds. Antibiotics usually do not help and they may be harmful. Bronchitis usually develops rapidly and lasts about 2 to 3 weeks in otherwise healthy people. Follow-up care is a key part of your treatment and safety. Be sure to make and go to all appointments, and call your doctor if you are having problems. It's also a good idea to know your test results and keep a list of the medicines you take. How can you care for yourself at home? · Take all medicines exactly as prescribed. Call your doctor if you think you are having a problem with your medicine. · Get some extra rest.  · Take an over-the-counter pain medicine, such as acetaminophen (Tylenol), ibuprofen (Advil, Motrin), or naproxen (Aleve) to reduce fever and relieve body aches. Read and follow all instructions on the label. · Do not take two or more pain medicines at the same time unless the doctor told you to. Many pain medicines have acetaminophen, which is Tylenol. Too much acetaminophen (Tylenol) can be harmful. · Take an over-the-counter cough medicine that contains dextromethorphan to help quiet a dry, hacking cough so that you can sleep. Avoid cough medicines that have more than one active ingredient. Read and follow all instructions on the label. · Breathe moist air from a humidifier, hot shower, or sink filled with hot water. The heat and moisture will thin mucus so you can cough it out. · Do not smoke. Smoking can make bronchitis worse. If you need help quitting, talk to your doctor about stop-smoking programs and medicines. These can increase your chances of quitting for good.   When should you call for help? Call 911 anytime you think you may need emergency care. For example, call if:    · You have severe trouble breathing.    Call your doctor now or seek immediate medical care if:    · You have new or worse trouble breathing.     · You cough up dark brown or bloody mucus (sputum).     · You have a new or higher fever.     · You have a new rash.    Watch closely for changes in your health, and be sure to contact your doctor if:    · You cough more deeply or more often, especially if you notice more mucus or a change in the color of your mucus.     · You are not getting better as expected. Where can you learn more? Go to http://daly-karthik.info/. Enter H333 in the search box to learn more about \"Bronchitis: Care Instructions. \"  Current as of: December 6, 2017  Content Version: 11.8  © 8445-7101 [employer] Care instructions adapted under license by [employer] (which disclaims liability or warranty for this information). If you have questions about a medical condition or this instruction, always ask your healthcare professional. [address] any warranty or liability for your use of this information.

## 2018-12-19 NOTE — PROGRESS NOTES
Sadaf Acosta is a 43 y.o. female   Chief Complaint   Patient presents with    Cough    Sore Throat    pt states she went to [facility] on Saturday and was given a z-ack and has completed this and states she feels worse today than she did then. Cough has worsened and is non productive. Pt is having sinus congestion and has gone from clear to thick green yellow mucous. Pt is taking nothing for the cough. Pt is using a neti pot for sinuses. Had  T Sunday monday states up to 102 but this has since resolved. she is a 43y.o. year old female who presents for evalution. Reviewed PmHx, RxHx, FmHx, SocHx, AllgHx and updated and dated in the chart. Review of Systems - negative except as listed above in the HPI    Objective:     Vitals:    12/19/18 1051   BP: 128/90   Pulse: 92   Resp: 16   Temp: 98.1 °F (36.7 °C)   TempSrc: Oral   SpO2: 97%   Weight: 201 lb (91.2 kg)   Height: 5' 4\" (1.626 m)       Current Outpatient Medications   Medication Sig    amoxicillin-clavulanate (AUGMENTIN) 875-125 mg per tablet Take 1 Tab by mouth every twelve (12) hours for 10 days.  benzonatate (TESSALON) 200 mg capsule Take 1 Cap by mouth three (3) times daily as needed for Cough.  erenumab-aooe (AIMOVIG AUTOINJECTOR) 70 mg/mL atIn 1 Device by SubCUTAneous route every thirty (30) days.  erenumab-aooe (AIMOVIG AUTOINJECTOR) 70 mg/mL atIn 1 Device by SubCUTAneous route every thirty (30) days.  buPROPion XL (WELLBUTRIN XL) 300 mg XL tablet TAKE ONE TABLET BY MOUTH EVERY MORNING    montelukast (SINGULAIR) 10 mg tablet Take 10 mg by mouth daily.  fluticasone propionate (FLONASE NA) by Nasal route.  cetirizine (ZYRTEC) 10 mg tablet Take  by mouth.  SUMAtriptan (IMITREX) 100 mg tablet Take 100 mg by mouth once as needed for Migraine.  pantoprazole (PROTONIX) 40 mg tablet TAKE ONE TABLET BY MOUTH DAILY    multivitamin (ONE A DAY) tablet Take 1 Tab by mouth daily.     ibuprofen (ADVIL) 200 mg tablet Take  by mouth.    loratadine (CLARITIN) 10 mg tablet Take 10 mg by mouth. No current facility-administered medications for this visit. Physical Examination: General appearance - alert, well appearing, and in no distress  Eyes - pupils equal and reactive, extraocular eye movements intact  Ears - bilateral TM's and external ear canals normal  Nose - mucosal congestion and mucosal erythema  Mouth - mucous membranes moist, pharynx normal without lesions  Neck - supple, no significant adenopathy  Chest - clear to auscultation, no wheezes, rales or rhonchi, symmetric air entry  Heart - normal rate, regular rhythm, normal S1, S2, no murmurs, rubs, clicks or gallops      Assessment/ Plan:   Diagnoses and all orders for this visit:    1. Acute non-recurrent maxillary sinusitis  -     amoxicillin-clavulanate (AUGMENTIN) 875-125 mg per tablet; Take 1 Tab by mouth every twelve (12) hours for 10 days. 2. Acute bronchitis, unspecified organism  -     amoxicillin-clavulanate (AUGMENTIN) 875-125 mg per tablet; Take 1 Tab by mouth every twelve (12) hours for 10 days. -     benzonatate (TESSALON) 200 mg capsule; Take 1 Cap by mouth three (3) times daily as needed for Cough. Follow-up Disposition:  Return if symptoms worsen or fail to improve. I have discussed the diagnosis with the patient and the intended plan as seen in the above orders. The patient has received an after-visit summary and questions were answered concerning future plans. Pt conveyed understanding of plan.     Medication Side Effects and Warnings were discussed with patient      Amado Kee, DO

## 2018-12-27 NOTE — TELEPHONE ENCOUNTER
Called, spoke to pt. Pt was informed PA is approved. Pt was informed she can use aimovig access card now. Pt verbalized understanding of information discussed w/ no further questions at this time.

## 2019-02-06 ENCOUNTER — OFFICE VISIT (OUTPATIENT)
Dept: NEUROLOGY | Age: 39
End: 2019-02-06

## 2019-02-06 VITALS
RESPIRATION RATE: 16 BRPM | DIASTOLIC BLOOD PRESSURE: 60 MMHG | BODY MASS INDEX: 34.66 KG/M2 | HEIGHT: 64 IN | SYSTOLIC BLOOD PRESSURE: 95 MMHG | HEART RATE: 78 BPM | OXYGEN SATURATION: 98 % | WEIGHT: 203 LBS

## 2019-02-06 DIAGNOSIS — G43.019 INTRACTABLE MIGRAINE WITHOUT AURA AND WITHOUT STATUS MIGRAINOSUS: Primary | ICD-10-CM

## 2019-02-06 NOTE — LETTER
2/6/2019 11:02 AM 
 
Patient:  Emil Estrada YOB: 1980 Date of Visit: 2/6/2019 Dear Jessica Coronel NP 
[address] [facility] [address] [id number] [place] [address] VIA In Basket 
 : Thank you for referring Ms. Katy Orona to me for evaluation/treatment. Below are the relevant portions of my assessment and plan of care. If you have questions, please do not hesitate to call me. I look forward to following Ms. Orona along with you. Sincerely, Manfred Carrasco MD

## 2019-02-06 NOTE — PROGRESS NOTES
Neurology Progress Note    Patient ID:  Jorje Foster  [id number]  66 y.o.  1980      Subjective:   History:  Jorje Foster is a 43 y.o. female who  has a past medical history of Coagulation disorder ([address] Dysmenorrhea, Endometriosis, GERD, who since teenage yrs, noted severe headaches R temple, 8/10, dull aching, lasting 2 days, occurring 16/ month, triggered by menstrual cycle(+) nausea (+) vomiting (+) photophobia (+) phonophobia (-) blurred vision. Tried Imitrex which helps, Exedrin did not help. Patient was part of the study with new CGRP meds (Eptinezumab IV infusion every 3 months) with benefit and so she is interested to be placed on this type of medication. Patient is a  and is concern about taking medications that can make her drowsy, tired and affect her BP.     Patient was started on Aimovig 70 mg SC monthly on 18, with decrease headache to 8/month wirh decrease intensity to 5/10, but developed constipation and generalized pruritus with no rash which lasted for 1 month. Currently, patient has no more itchiness.       ROS:  Per HPI-  Otherwise the remainder of ROS was negative    Social Hx  Social History     Socioeconomic History    Marital status:      Spouse name: Not on file    Number of children: Not on file    Years of education: Not on file    Highest education level: Not on file   Tobacco Use    Smoking status: Former Smoker     Packs/day: 0.25     Years: 20.00     Pack years: 5.00     Last attempt to quit: 3/21/2014     Years since quittin.8    Smokeless tobacco: Never Used   Substance and Sexual Activity    Alcohol use:  Yes     Alcohol/week: 0.5 oz     Types: 1 Shots of liquor per week     Comment: rarely    Drug use: No    Sexual activity: Yes     Partners: Male     Birth control/protection: Surgical     Comment: tubal ligation       Meds:  Current Outpatient Medications on File Prior to Visit   Medication Sig Dispense Refill    pantoprazole (PROTONIX) 40 mg tablet TAKE ONE TABLET BY MOUTH DAILY 90 Tab 1    erenumab-aooe (AIMOVIG AUTOINJECTOR) 70 mg/mL atIn 1 Device by SubCUTAneous route every thirty (30) days. 1 Syringe 0    buPROPion XL (WELLBUTRIN XL) 300 mg XL tablet TAKE ONE TABLET BY MOUTH EVERY MORNING 30 Tab 5    montelukast (SINGULAIR) 10 mg tablet Take 10 mg by mouth daily.  multivitamin (ONE A DAY) tablet Take 1 Tab by mouth daily.  loratadine (CLARITIN) 10 mg tablet Take 10 mg by mouth.  benzonatate (TESSALON) 200 mg capsule Take 1 Cap by mouth three (3) times daily as needed for Cough. 30 Cap 1    erenumab-aooe (AIMOVIG AUTOINJECTOR) 70 mg/mL atIn 1 Device by SubCUTAneous route every thirty (30) days. 1 Syringe 5    fluticasone propionate (FLONASE NA) by Nasal route.  cetirizine (ZYRTEC) 10 mg tablet Take  by mouth.  SUMAtriptan (IMITREX) 100 mg tablet Take 100 mg by mouth once as needed for Migraine.  ibuprofen (ADVIL) 200 mg tablet Take  by mouth. No current facility-administered medications on file prior to visit. Imaging:    CT Results (recent):  Results from Hospital Encounter encounter on 02/19/13   CT HEAD WO CONT    Narrative **Final Report**       ICD Codes / Adm. Diagnosis: 780.60  52 / Fever  Headache  Examination:  CT HEAD WO CON  - [id number] - Feb 19 2013  7:46PM  Accession No:  [id number]  Reason:  HA      REPORT:  Indication:  HA    Comparison: None    Findings: 5 mm axial images were obtained from the skull base through the   vertex. The ventricles and cortical sulci are appropriate in size and   configuration. There is no evidence of intracranial hemorrhage, mass, mass   effect, or acute infarct. No extra-axial fluid collections are seen. The   visualized paranasal sinuses and mastoid air cells are clear. The orbital   structures are unremarkable. No osseous abnormalities are seen. IMPRESSION: Normal head CT.                 Signing/Reading Doctor: Unruly Sanders ([id number]) Cindy Rincon ([id number])  Feb 19 2013  7:49PM                                      MRI Results (recent):  Results from Hospital Encounter encounter on 03/07/12   MRI ABDOMEN W MRCP W/O CONTRAST    Narrative **Final Report**       ICD Codes / Adm. Diagnosis: 789.00   / Junius Salts  Examination:  MR ABDOMEN MRCP WO CON  - [id number] - Mar  7 2012  7:53PM  Accession No:  [id number]  Reason:  rule out common bile duct stone      REPORT:  INDICATION:    rule out common bile duct stone , cholecystectomy, clinical   biliary dyskinesia    COMPARISON: None. TECHNIQUE:   MR of the abdomen was performed and the following sequences were obtained:   Coronal HASTE, multiplanar MRCP, axial in and out-of-phase T1, axial T1   fat-saturated, axial T2, and pre-  contrast T1-weighted images   FINDINGS:  The liver is normal.    The spleen is normal.  The pancreas is normal.  The   adrenal glands are normal.  The kidneys are normal. The left kidney contains   a 8mm bilobed cyst.    There is no intra or extrahepatic biliary ductal dilatation. No biliary   strictures or filling defects are seen. There is no pancreas divisum. There is no lymphadenopathy or ascites. IMPRESSION:      No evidence for a retained common bile duct stone. Simple left renal cyst.            Signing/Reading Doctor: Donna Timmons ([id number])    Approved: Donna Timmons ([id number])  03/08/2012                                      IR Results (recent):  No results found for this or any previous visit. VAS/US Results (recent):  No results found for this or any previous visit. Reviewed records in [place] and media tab today    Lab Review     No visits with results within 3 Month(s) from this visit. Latest known visit with results is:   Office Visit on 10/18/2018   Component Date Value Ref Range Status    Diagnosis 10/19/2018 Comment   Final    Comment: NEGATIVE FOR INTRAEPITHELIAL LESION AND MALIGNANCY.   THIS SPECIMEN WAS RESCREENED AS PART OF OUR  PROGRAM.      Specimen adequacy 10/19/2018 Comment   Final    Comment: Satisfactory for evaluation. No endocervical component is identified. The absence of an endocervical component was confirmed by an additional  screening evaluation.  Clinician provided ICD10 10/19/2018 Comment   Final    Comment: Z01.419  Z12.4      Performed by: 10/19/2018 Comment   Final    Ana Diaz, Cytotechnologist (ASCP)    QC reviewed by: 10/19/2018 Comment   Final    Nica Will Cytotechnologist (ASCP)    . 10/19/2018 . Final    Note: 10/19/2018 Comment   Final    Comment: The Pap smear is a screening test designed to aid in the detection of  premalignant and malignant conditions of the uterine cervix. It is not a  diagnostic procedure and should not be used as the sole means of detecting  cervical cancer. Both false-positive and false-negative reports do occur.  Test methodology 10/19/2018 Comment   Final    Comment: This liquid based ThinPrep(R) pap test was screened with the  use of an image guided system.  . 10/19/2018 Comment   Final    Comment: The HPV DNA reflex criteria were not met with this specimen result  therefore, no HPV testing was performed. Objective:       Exam:  Visit Vitals  BP 95/60 (BP 1 Location: Right arm, BP Patient Position: Sitting)   Pulse 78   Resp 16   Ht 5' 4\" (1.626 m)   Wt 92.1 kg (203 lb)   SpO2 98%   BMI 34.84 kg/m²     Gen: Awake, alert, follows commands  Appropriate appearance, normal speech. Oriented to all spheres. No visual field defect on confrontation exam.  Full eyes movement, with no nystagmus, no diplopia, no ptosis. Normal gag and swallow. All remaining cranial nerves were normal  Motor function: 5/5 in all extremities  Sensory: intact to LT, PP and JPS  Good FTN and HTS   Gait: Normal    Assessment:       ICD-10-CM ICD-9-CM    1.  Intractable migraine without aura and without status migrainosus G43.019 346.11 Pruritus, possible drug reaction to Aimovig      Plan:   1. Since patient has no more pruritus, and her headaches are better, will continue Aimovig 70 mg SC Q monthly as migraine prophylaxis. 2. Will consider increasing Aimovig to 140 mg depending on how she is doing on next follow up  3. Continue Imitrex prn  4. Continue headache diary and list that can trigger headache (stress, caffeine, menstrual cycle, cheese)         Follow-up Disposition:  Return in about 3 months (around 5/6/2019).           Radha Hoyos MD  Diplomate, American Board of Psychiatry and Neurology  Diplomate, Neuromuscular Medicine  Diplomate, American Board of Electrodiagnostic Medicine

## 2019-04-30 RX ORDER — BUPROPION HYDROCHLORIDE 300 MG/1
TABLET ORAL
Qty: 30 TAB | Refills: 4 | Status: SHIPPED | OUTPATIENT
Start: 2019-04-30 | End: 2019-10-01 | Stop reason: SDUPTHER

## 2019-07-01 RX ORDER — ERENUMAB-AOOE 70 MG/ML
INJECTION SUBCUTANEOUS
Qty: 1 EACH | Refills: 5 | Status: SHIPPED | OUTPATIENT
Start: 2019-07-01 | End: 2020-01-13

## 2019-08-30 DIAGNOSIS — K21.9 GASTROESOPHAGEAL REFLUX DISEASE WITHOUT ESOPHAGITIS: ICD-10-CM

## 2019-09-02 RX ORDER — PANTOPRAZOLE SODIUM 40 MG/1
TABLET, DELAYED RELEASE ORAL
Qty: 90 TAB | Refills: 0 | Status: SHIPPED | OUTPATIENT
Start: 2019-09-02 | End: 2020-02-26 | Stop reason: SDUPTHER

## 2019-09-05 DIAGNOSIS — K21.9 GASTROESOPHAGEAL REFLUX DISEASE WITHOUT ESOPHAGITIS: ICD-10-CM

## 2019-09-05 RX ORDER — PANTOPRAZOLE SODIUM 40 MG/1
TABLET, DELAYED RELEASE ORAL
Qty: 90 TAB | Refills: 0 | Status: SHIPPED | OUTPATIENT
Start: 2019-09-05 | End: 2020-02-26

## 2019-10-02 RX ORDER — BUPROPION HYDROCHLORIDE 300 MG/1
TABLET ORAL
Qty: 30 TAB | Refills: 3 | Status: SHIPPED | OUTPATIENT
Start: 2019-10-02 | End: 2020-03-18 | Stop reason: SDUPTHER

## 2019-11-22 ENCOUNTER — TELEPHONE (OUTPATIENT)
Dept: NEUROLOGY | Age: 39
End: 2019-11-22

## 2019-11-22 NOTE — TELEPHONE ENCOUNTER
Prior Auth APPROVED for Aimovig by [employer] via Cover [employer] Med[employer]. Effective Dates 11/22/19 - 11/21/20. Case #[id number]. Approval will be scanned into media. Please send Rx to patient's preferred pharmacy (if necessary).      CMM Key: [id number]

## 2019-11-22 NOTE — TELEPHONE ENCOUNTER
Sent patient a [employer] message that her Aimovig was approved but she is due for an office follow up.

## 2019-11-22 NOTE — LETTER
11/22/2019 11:50 AM 
 
Ms. Katy Orona [address] [place] [id number] Your Janis Bynum was approved from now until 11/21/2020, but our records indicate that you are due for a follow up appointment in the office. Please call our office at your earliest convenience to schedule this, or if you are on the patient portal [employer] you can message me to schedule this for you. Sincerely, Ginna Wu MD

## 2020-01-13 RX ORDER — ERENUMAB-AOOE 70 MG/ML
INJECTION SUBCUTANEOUS
Qty: 1 SYRINGE | Refills: 5 | Status: SHIPPED | OUTPATIENT
Start: 2020-01-13 | End: 2020-03-18 | Stop reason: SDUPTHER

## 2020-01-19 NOTE — PROGRESS NOTES
Getting better, 2 times a week, less pain. Hospitalist Progress Note Patient: Ju Gonzalez Age: 60 y.o. : 1957 MR#: [id number] SSN: [id number] Date/Time: 2020 9:39 AM 
 
DOA: 2020 PCP: Gabino Delgadillo NP Subjective:  
 
Family at bedside. Pt feels better today, cough but no productive, still wheezing. Not confuse. No fever. Able to reviewed and reconcile home medications. She is still on methadone for heroin abuse No chest pain ON iv antibiotics. ROS: no fever/chills, no headache, no dizziness, no facial pain, no sinus congestion, No swallowing pain, No chest pain, no palpitation, + shortness of breath, no abd pain, No diarrhea, no urinary complaint, no leg pain or swelling Assessment/Plan: 1. Acute metabolic encephalopathy, likely related to pneumonia, sepsis 2. Sepsis POA (fever 101.2F, leukocytosis, tachycardia, tachypnea, pna) 3. Acute on chronic hypoxic respiratory failure, improved now. 4.  Acute COPD exacerbation 5. Suspected pneumonia, possible viral bronchitis, CT chest follow up, pending procalcitonine 6. Sarcoidosis, on chronic steroid 7. GERD 8. Chronic diastolic heart failure, not decompensated. 9.  Hypertension 10. DM2 
11. Hypothyroidism 12. Sickle cell trait with normocytic anemia 13. Obesity 14. Opioid dependence on methadone 15. Leukocytosis 16. Thrombocytosis 17. Chronic back pain with recent S/P T10, T9, T8 laminectomy; T7, T8, T9, T10, T11, T12 posterior thoracic fusion; segmental spinal instrumentation; K2M Davis type, T7-T8, T11-T12 (2019 - Dr. Steven Saba) 18. Depression, h/o bipolar that she has been discontinue Depakote Continue with IV antibiotics, sputum Cx, legionella/strep Ag, ICS. Blood Cx follow up. Check xray but will get CT chest for further care. Check procalcitonine Cont with bronchodilator, budesonide, increase oral prednisone Curve-sided pulmonary, will need follow up if persistent symptom Check proBNP and CBC, BMP tomorrow ISS and resume lantus dosing Reconcile med, she does not take depakote and not on trazodone, but still on zoloft Spine surgery will follow up. PT/OT  
PPI Full code Additional Notes:   
Time spent >30 minutes Case discussed with:  [x]Patient  [x]Family  [x]Nursing  [x]Case Management DVT Prophylaxis:  []Lovenox  [x]Hep SQ  []SCDs  []Coumadin   []On Heparin gtt Signed By: Valentín Angel MD   
 2020 9:39 AM  
  
 
 
 
 
Objective:  
VS:  
Visit Vitals BP 95/61 (BP 1 Location: Left arm, BP Patient Position: At rest) Pulse 91 Temp 98.5 °F (36.9 °C) Resp 20 Wt 75 kg (165 lb 4.8 oz) LMP 2012 (Exact Date) SpO2 96% Breastfeeding No  
BMI 27.51 kg/m² Tmax/24hrs: Temp (24hrs), Av.5 °F (36.9 °C), Min:98.2 °F (36.8 °C), Max:98.7 °F (37.1 °C) No intake or output data in the 24 hours ending 20 0939 Tele:  
General:  Cooperative, Not in acute distress, speaks in short sentence while in bed HEENT: PERRL, EOMI, supple neck, no JVD, dry oral mucosa Cardiovascular: S1S2 regular, no rub/gallop Pulmonary: air entry bilaterally, +++ wheezing, no crackle GI:  Soft, non tender, non distended, +bs, no guarding Extremities:  No pedal edema, +distal pulses appreciated Neuro: AOx3, moving all extremities, no gross deficit. Additional:  
 
 
Current Facility-Administered Medications Medication Dose Route Frequency  [START ON 2020] VANCOMYCIN TROUGH DUE   Other Daily  cefepime (MAXIPIME) 2 g in sterile water (preservative free) 10 mL IV syringe  2 g IntraVENous Q12H  
 oxyCODONE IR (ROXICODONE) tablet 5 mg  5 mg Oral Q4H PRN  
 naloxone (NARCAN) injection 0.4 mg  0.4 mg IntraVENous EVERY 2 MINUTES AS NEEDED  
 sodium chloride (NS) flush 5-40 mL  5-40 mL IntraVENous Q8H  
 sodium chloride (NS) flush 5-40 mL  5-40 mL IntraVENous PRN  
 diphenhydrAMINE (BENADRYL) injection 25 mg  25 mg IntraVENous Q4H PRN  
  heparin (porcine) injection 5,000 Units  5,000 Units SubCUTAneous Q8H  
 insulin lispro (HUMALOG) injection   SubCUTAneous AC&HS  
 glucose chewable tablet 16 g  4 Tab Oral PRN  
 glucagon (GLUCAGEN) injection 1 mg  1 mg IntraMUSCular PRN  predniSONE (DELTASONE) tablet 20 mg  20 mg Oral DAILY WITH BREAKFAST  albuterol (PROVENTIL VENTOLIN) nebulizer solution 2.5 mg  2.5 mg Nebulization Q4H PRN  
 aspirin chewable tablet 81 mg  81 mg Oral DAILY WITH BREAKFAST  famotidine (PEPCID) tablet 20 mg  20 mg Oral QHS  ferrous sulfate tablet 325 mg  325 mg Oral DAILY WITH BREAKFAST  gabapentin (NEURONTIN) capsule 300 mg  300 mg Oral DAILY  insulin glargine (LANTUS) injection 40 Units  40 Units SubCUTAneous ACB  levothyroxine (SYNTHROID) tablet 100 mcg  100 mcg Oral ACB  rosuvastatin (CRESTOR) tablet 20 mg  20 mg Oral QHS  divalproex DR (DEPAKOTE) tablet 250 mg  250 mg Oral DAILY  influenza vaccine 2019-20 (6 mos+)(PF) (FLUARIX/FLULAVAL/FLUZONE QUAD) injection 0.5 mL  0.5 mL IntraMUSCular PRIOR TO DISCHARGE  dextrose 10% infusion 125-250 mL  125-250 mL IntraVENous PRN  
 sodium chloride (NS) flush 5-10 mL  5-10 mL IntraVENous PRN  
 vancomycin (VANCOCIN) 1,000 mg in 0.9% sodium chloride (MBP/ADV) 250 mL adv  1,000 mg IntraVENous Q12H Lab/Data Review: 
Labs: Results:  
   
Chemistry Recent Labs  
  01/19/20 
0242 01/18/20 
0801 01/18/20 
0556 01/17/20 1935 *  --  91 118*   --  140 134* K 3.7  --  3.9 3.6   --  105 96* CO2 29  --  30 32 BUN 14  --  13 15 CREA 0.92  --  0.83 0.96  
BUCR 15  --  16 16 AGAP 6  --  5 6 CA 8.7  --  8.8 9.8 PHOS  --  4.0  --   --   
 
Recent Labs  
  01/18/20 
0801 01/17/20 
1935 ALT 16 26 SGOT 16 15  
TP 6.6 8.2 ALB 2.6* 3.6 GLOB 4.0 4.6* AGRAT 0.7* 0.8 CBC w/Diff Recent Labs  
  01/18/20 
0801 01/17/20 
1935 WBC 12.1 16.1*  
RBC 4.38 4.97  
HGB 12.7 14.8 HCT 38.8 43.8 MCV 88.6 88.1 MCH 29.0 29.8 MCHC 32.7 33.8  
RDW 17.8* 17.7* PLT 77* 99* GRANS 74* 76* LYMPH 17* 17* EOS 0 0 Coagulation No results for input(s): PTP, INR, APTT, INREXT in the last 72 hours. Iron/Ferritin Lab Results Component Value Date/Time Iron 11 (L) 12/13/2019 05:45 AM  
 TIBC 215 (L) 12/13/2019 05:45 AM  
 Iron % saturation 5 12/13/2019 05:45 AM  
 Ferritin 146 12/13/2019 05:45 AM  
   
BNP Cardiac Enzymes Lab Results Component Value Date/Time  12/10/2019 05:17 PM  
 CK - MB 2.4 02/17/2019 01:30 PM  
 CK-MB Index 1.4 02/17/2019 01:30 PM  
 Troponin-I, QT <0.02 10/17/2019 06:26 PM  
 
  
Lactic Acid Thyroid Studies All Micro Results Procedure Component Value Units Date/Time LEGIONELLA PNEUMOPHILA AG, URINE [[id number]] Order Status:  Sent Specimen:  Urine [place], URINE [[id number]] Order Status:  Sent Specimen:  Urine CULTURE, BLOOD [[id number]] Collected:  01/17/20 1935 Order Status:  Completed Specimen:  Blood Updated:  01/19/20 1820 Special Requests: NO SPECIAL REQUESTS Culture result: NO GROWTH 2 DAYS     
 CULTURE, BLOOD [[id number]] Collected:  01/17/20 2102 Order Status:  Completed Specimen:  Blood Updated:  01/19/20 4475 Special Requests: --     
  NO SPECIAL REQUESTS 
LEFT 
FOREARM Culture result: NO GROWTH 2 DAYS INFLUENZA A & B AG (RAPID TEST) [[id number]] Collected:  01/17/20 2049 Order Status:  Completed Specimen:  Nasopharyngeal from Nasal washing Updated:  01/17/20 2130 Influenza A Antigen NEGATIVE Comment: A negative result does not exclude influenza virus infection, seasonal or H1N1 due to suboptimal sensitivity. If influenza is circulating in your community, a diagnosis of influenza should be considered based on a patients clinical presentation and empiric antiviral treatment should be considered, if indicated. Influenza B Antigen NEGATIVE Images: CT (Most Recent). XRAY (Most Recent) XR Results (most recent): 
Results from Hospital Encounter encounter on 01/17/20 XR CHEST PORT Narrative AP CHEST, PORTABLE 
 
CPT CODE: 00013 INDICATION: Above. Pneumonia. COMPARISON: 1/17/2020. TECHNIQUE: Portable  70 degree upright AP chest radiograph is reviewed. FINDINGS: 
 
Advanced pulmonary emphysematous disease with fibrotic changes, as better 
demonstrated on prior chest CT of 2/6/2018. Diffuse prominence of the 
interstitial markings without significant interval change compared to the 
preceding radiograph. Findings are similar to minimally slightly more prominent 
compared to earlier chest radiographs, for example reference radiograph of 
12/7/2019, questionable for possible pulmonary vascular congestion with mild 
predominantly interstitial pulmonary edema superimposed upon predominantly 
chronic lung disease, though the findings are favored to be largely chronic. No evidence of focal pulmonary consolidation. No evidence of pneumothorax. The 
costophrenic sulci appear sharp. The cardiac silhouette is within normal limits in size  for technique. EKG leads/wires overlie the patient. Bilateral thoracic spinal fusion hardware 
again noted. Impression IMPRESSION:  
 
No significant interval change compared to the preceding radiograph as 
described. EKG No results found for this or any previous visit. 2D ECHO 12/10/19 ECHO ADULT FOLLOW-UP OR LIMITED 12/12/2019 12/12/2019 Narrative · Left Ventricle: Small left ventricle. Mild concentric hypertrophy. Estimated left ventricular ejection fraction is 66 - 70%. No regional wall  
motion abnormality noted. · Pulmonary Artery: Mild pulmonary hypertension. Pulmonary arterial  
systolic pressure is 39 mmHg.  
   
  Signed by: Albina Nayak MD

## 2020-02-26 DIAGNOSIS — K21.9 GASTROESOPHAGEAL REFLUX DISEASE WITHOUT ESOPHAGITIS: ICD-10-CM

## 2020-02-26 RX ORDER — PANTOPRAZOLE SODIUM 40 MG/1
TABLET, DELAYED RELEASE ORAL
Qty: 90 TAB | Refills: 0 | Status: SHIPPED | OUTPATIENT
Start: 2020-02-26 | End: 2020-05-26

## 2020-03-03 RX ORDER — BUPROPION HYDROCHLORIDE 300 MG/1
TABLET ORAL
Qty: 30 TAB | Refills: 2 | OUTPATIENT
Start: 2020-03-03

## 2020-03-18 NOTE — TELEPHONE ENCOUNTER
Called patient. (Necol) Patient states understanding of upcomming appointment (3/23/20) cancelled. Patient will call office to reschedule. RX refill needed as patient is out of medication. Refill pended with Pharmacy Change. [facility].

## 2020-03-19 RX ORDER — ERENUMAB-AOOE 70 MG/ML
INJECTION SUBCUTANEOUS
Qty: 1 SYRINGE | Refills: 5 | Status: SHIPPED | OUTPATIENT
Start: 2020-03-19

## 2020-03-19 RX ORDER — BUPROPION HYDROCHLORIDE 300 MG/1
TABLET ORAL
Qty: 30 TAB | Refills: 3 | Status: SHIPPED | OUTPATIENT
Start: 2020-03-19 | End: 2020-04-13 | Stop reason: SDUPTHER

## 2020-04-13 RX ORDER — BUPROPION HYDROCHLORIDE 300 MG/1
TABLET ORAL
Qty: 30 TAB | Refills: 3 | Status: SHIPPED | OUTPATIENT
Start: 2020-04-13

## 2020-05-26 DIAGNOSIS — K21.9 GASTROESOPHAGEAL REFLUX DISEASE WITHOUT ESOPHAGITIS: ICD-10-CM

## 2020-05-26 RX ORDER — PANTOPRAZOLE SODIUM 40 MG/1
TABLET, DELAYED RELEASE ORAL
Qty: 90 TAB | Refills: 0 | Status: SHIPPED | OUTPATIENT
Start: 2020-05-26

## 2021-04-06 NOTE — PROGRESS NOTES
Felecia Lopez is a ,  42 y.o. female WHITE whose LMP was on 2021 who presents for her annual checkup. She had an ablation done in [place] in October and since then she has had the constant urge to urinate. She has had constant UTI's since the procedure. Her cycles are still heavy, she has to change her pad in the middle of the night. Tried IUD in the past - had an ectopic    Menstrual status:    Her periods are heavy in flow. She denies dysmenorrhea. She reports no premenstrual symptoms. The patient is not using HRT. Contraception:    The current method of family planning is tubal ligation. Sexual history:    She  reports being sexually active and has had partner(s) who are Male. She reports using the following method of birth control/protection: Surgical.    Medical conditions:    Since her last annual GYN exam about three or more years ago, she has had the following changes in her health history: none. Pap and Mammogram History:    Her most recent Pap smear was 10/19/2018 neg    The patient has not had a recent mammogram.    Breast Cancer History/Substance Abuse:    She has no  family history of breast cancer. Osteoporosis History:    Family history does not include a first or second degree relative with osteopenia or osteoporosis.       Past Medical History:   Diagnosis Date    Coagulation disorder ([address]     anemic    Dysmenorrhea 3/15/2010    Endometriosis 3/15/2010    GERD (gastroesophageal reflux disease)     Ill-defined condition     depression    Migraine 3/15/2010    Ulcer      Past Surgical History:   Procedure Laterality Date    HX CHOLECYSTECTOMY  3/2012    HX GYN       10 7 2-06    HX GYN      d  & C    HX ORTHOPAEDIC  2012    right knee surg    [place] C-SEC ONLY,PREV C-SEC      time 3     Current Outpatient Medications   Medication Sig Dispense Refill    pantoprazole (PROTONIX) 40 mg tablet TAKE ONE TABLET BY MOUTH DAILY 90 Tab 0    buPROPion XL (WELLBUTRIN XL) 300 mg XL tablet TAKE ONE TABLET BY MOUTH EVERY MORNING 30 Tab 3    erenumab-aooe (Aimovig Autoinjector) 70 mg/mL injection INJECT 1ML UNDER THE SKIN EVERY 30 DAYS 1 Syringe 5    benzonatate (TESSALON) 200 mg capsule Take 1 Cap by mouth three (3) times daily as needed for Cough. 30 Cap 1    montelukast (SINGULAIR) 10 mg tablet Take 10 mg by mouth daily.  fluticasone propionate (FLONASE NA) by Nasal route.  cetirizine (ZYRTEC) 10 mg tablet Take  by mouth.  SUMAtriptan (IMITREX) 100 mg tablet Take 100 mg by mouth once as needed for Migraine.  multivitamin (ONE A DAY) tablet Take 1 Tab by mouth daily.  ibuprofen (ADVIL) 200 mg tablet Take  by mouth.  loratadine (CLARITIN) 10 mg tablet Take 10 mg by mouth. Allergies: Clindamycin and Acetaminophen   Social History     Socioeconomic History    Marital status:      Spouse name: Not on file    Number of children: Not on file    Years of education: Not on file    Highest education level: Not on file   Occupational History    Not on file   Social Needs    Financial resource strain: Not on file    Food insecurity     Worry: Not on file     Inability: Not on file    Transportation needs     Medical: Not on file     Non-medical: Not on file   Tobacco Use    Smoking status: Former Smoker     Packs/day: 0.25     Years: 20.00     Pack years: 5.00     Quit date: 3/21/2014     Years since quittin.0    Smokeless tobacco: Never Used   Substance and Sexual Activity    Alcohol use:  Yes     Alcohol/week: 0.8 standard drinks     Types: 1 Shots of liquor per week     Comment: rarely    Drug use: No    Sexual activity: Yes     Partners: Male     Birth control/protection: Surgical     Comment: tubal ligation   Lifestyle    Physical activity     Days per week: Not on file     Minutes per session: Not on file    Stress: Not on file   Relationships    Social connections     Talks on phone: Not on file     Gets together: Not on file     Attends Church service: Not on file     Active member of club or organization: Not on file     Attends meetings of clubs or organizations: Not on file     Relationship status: Not on file    Intimate partner violence     Fear of current or ex partner: Not on file     Emotionally abused: Not on file     Physically abused: Not on file     Forced sexual activity: Not on file   Other Topics Concern    Not on file   Social History Narrative    Not on file     Tobacco History:  reports that she quit smoking about 7 years ago. She has a 5.00 pack-year smoking history. She has never used smokeless tobacco.  Alcohol Abuse:  reports current alcohol use of about 0.8 standard drinks of alcohol per week. Drug Abuse:  reports no history of drug use.   Patient Active Problem List   Diagnosis Code    Migraine G43.909    Dysmenorrhea N94.6    Endometriosis N80.9    Anxiety F41.9    Depression F32.9    Iron deficiency anemia D50.9         Review of Systems - History obtained from the patient  Constitutional: negative for weight loss, fever, night sweats  HEENT: negative for hearing loss, earache, congestion, snoring, sorethroat  CV: negative for chest pain, palpitations, edema  Resp: negative for cough, shortness of breath, wheezing  GI: negative for change in bowel habits, abdominal pain, black or bloody stools  : negative for frequency, dysuria, hematuria, vaginal discharge  MSK: negative for back pain, joint pain, muscle pain  Breast: negative for breast lumps, nipple discharge, galactorrhea  Skin :negative for itching, rash, hives  Neuro: negative for dizziness, headache, confusion, weakness  Psych: negative for anxiety, depression, change in mood  Heme/lymph: negative for bleeding, bruising, pallor    Physical Exam    Visit Vitals  /87   Wt 187 lb 12.8 oz (85.2 kg)   LMP 04/02/2021   BMI 32.24 kg/m²     Constitutional  · Appearance: well-nourished, well developed, alert, in no acute distress    HENT  · Head and Face: appears normal    Neck  · Inspection/Palpation: normal appearance, no masses or tenderness  · Lymph Nodes: no lymphadenopathy present  · Thyroid: gland size normal, nontender, no nodules or masses present on palpation    Chest  · Respiratory Effort: breathing normal  · Auscultation: normal breath sounds    Cardiovascular  · Heart:  · Auscultation: regular rate and rhythm without murmur    Breasts  · Inspection of Breasts: breasts symmetrical, no skin changes, no discharge present, nipple appearance normal, no skin retraction present  · Palpation of Breasts and Axillae: no masses present on palpation, no breast tenderness  · Axillary Lymph Nodes: no lymphadenopathy present    Gastrointestinal  · Abdominal Examination: abdomen non-tender to palpation, normal bowel sounds, no masses present  · Liver and spleen: no hepatomegaly present, spleen not palpable  · Hernias: no hernias identified    Skin  · General Inspection: no rash, no lesions identified    Neurologic/Psychiatric  · Mental Status:  · Orientation: grossly oriented to person, place and time  · Mood and Affect: mood normal, affect appropriate    Genitourinary  · External Genitalia: normal appearance for age, no discharge present, no tenderness present, no inflammatory lesions present, no masses present, no atrophy present  · Vagina: normal vaginal vault without central or paravaginal defects, no discharge present, no inflammatory lesions present, no masses present  · Bladder: non-tender to palpation  · Urethra: appears normal  · Cervix: normal   · Uterus: normal size, shape and consistency  · Adnexa: no adnexal tenderness present, no adnexal masses present  · Perineum: perineum within normal limits, no evidence of trauma, no rashes or skin lesions present  · Anus: anus within normal limits, no hemorrhoids present  · Inguinal Lymph Nodes: no lymphadenopathy present  Results for orders placed or performed in visit on 04/08/21   AMB POC HEMOGLOBIN (HGB)   Result Value Ref Range    Hemoglobin (POC) 12.8 G/DL       Assessment:  Routine gynecologic examination  Her current medical status is satisfactory with no evidence of significant gynecologic issues.   Menorrhagia - hx of anemia  Hx of pelvic adhesions  Plan:  Counseled re: diet, exercise, healthy lifestyle  Return for yearly wellness visits  Rec annual mammogram  Pap/HPV  Start Loloestrin - let me know if not better by 4th pack

## 2021-04-08 ENCOUNTER — OFFICE VISIT (OUTPATIENT)
Dept: OBGYN CLINIC | Age: 41
End: 2021-04-08
Payer: COMMERCIAL

## 2021-04-08 VITALS — BODY MASS INDEX: 32.24 KG/M2 | WEIGHT: 187.8 LBS | SYSTOLIC BLOOD PRESSURE: 116 MMHG | DIASTOLIC BLOOD PRESSURE: 87 MMHG

## 2021-04-08 DIAGNOSIS — Z01.419 ENCOUNTER FOR GYNECOLOGICAL EXAMINATION (GENERAL) (ROUTINE) WITHOUT ABNORMAL FINDINGS: Primary | ICD-10-CM

## 2021-04-08 DIAGNOSIS — Z11.51 SPECIAL SCREENING EXAMINATION FOR HUMAN PAPILLOMAVIRUS (HPV): ICD-10-CM

## 2021-04-08 DIAGNOSIS — N92.1 MENORRHAGIA WITH IRREGULAR CYCLE: ICD-10-CM

## 2021-04-08 LAB — HGB BLD-MCNC: 12.8 G/DL

## 2021-04-08 PROCEDURE — 85018 HEMOGLOBIN: CPT | Performed by: OBSTETRICS & GYNECOLOGY

## 2021-04-08 PROCEDURE — 99386 PREV VISIT NEW AGE 40-64: CPT | Performed by: OBSTETRICS & GYNECOLOGY

## 2021-04-08 RX ORDER — NORETHINDRONE ACETATE AND ETHINYL ESTRADIOL, ETHINYL ESTRADIOL AND FERROUS FUMARATE 1MG-10(24)
1 KIT ORAL DAILY
Qty: 1 PACKAGE | Refills: 12 | Status: SHIPPED | OUTPATIENT
Start: 2021-04-08 | End: 2021-07-07

## 2021-04-08 NOTE — PATIENT INSTRUCTIONS
Well Visit, Ages 25 to 48: Care Instructions Overview Well visits can help you stay healthy. Your doctor has checked your overall health and may have suggested ways to take good care of yourself. Your doctor also may have recommended tests. At home, you can help prevent illness with healthy eating, regular exercise, and other steps. Follow-up care is a key part of your treatment and safety. Be sure to make and go to all appointments, and call your doctor if you are having problems. It's also a good idea to know your test results and keep a list of the medicines you take. How can you care for yourself at home? · Get screening tests that you and your doctor decide on. Screening helps find diseases before any symptoms appear. · Eat healthy foods. Choose fruits, vegetables, whole grains, protein, and low-fat dairy foods. Limit fat, especially saturated fat. Reduce salt in your diet. · Limit alcohol. If you are a man, have no more than 2 drinks a day or 14 drinks a week. If you are a woman, have no more than 1 drink a day or 7 drinks a week. · Get at least 30 minutes of physical activity on most days of the week. Walking is a good choice. You also may want to do other activities, such as running, swimming, cycling, or playing tennis or team sports. Discuss any changes in your exercise program with your doctor. · Reach and stay at a healthy weight. This will lower your risk for many problems, such as obesity, diabetes, heart disease, and high blood pressure. · Do not smoke or allow others to smoke around you. If you need help quitting, talk to your doctor about stop-smoking programs and medicines. These can increase your chances of quitting for good. · Care for your mental health. It is easy to get weighed down by worry and stress. Learn strategies to manage stress, like deep breathing and mindfulness, and stay connected with your family and community.  If you find you often feel sad or hopeless, talk with your doctor. Treatment can help. · Talk to your doctor about whether you have any risk factors for sexually transmitted infections (STIs). You can help prevent STIs if you wait to have sex with a new partner (or partners) until you've each been tested for STIs. It also helps if you use condoms (male or female condoms) and if you limit your sex partners to one person who only has sex with you. Vaccines are available for some STIs, such as HPV. · Use birth control if it's important to you to prevent pregnancy. Talk with your doctor about the choices available and what might be best for you. · If you think you may have a problem with alcohol or drug use, talk to your doctor. This includes prescription medicines (such as amphetamines and opioids) and illegal drugs (such as cocaine and methamphetamine). Your doctor can help you figure out what type of treatment is best for you. · Protect your skin from too much sun. When you're outdoors from 10 a.m. to 4 p.m., stay in the shade or cover up with clothing and a hat with a wide brim. Wear sunglasses that block UV rays. Even when it's cloudy, put broad-spectrum sunscreen (SPF 30 or higher) on any exposed skin. · See a dentist one or two times a year for checkups and to have your teeth cleaned. · Wear a seat belt in the car. When should you call for help? Watch closely for changes in your health, and be sure to contact your doctor if you have any problems or symptoms that concern you. Where can you learn more? Go to http://www.[employer].com/ Enter P072 in the search box to learn more about \"Well Visit, Ages 25 to 48: Care Instructions. \" Current as of: May 27, 2020               Content Version: 12.8 © 3522-0186 [employer]. Care instructions adapted under license by [employer] (which disclaims liability or warranty for this information).  If you have questions about a medical condition or this instruction, always ask your healthcare professional. [place] [address] any warranty or liability for your use of this information.

## 2021-04-13 LAB
CYTOLOGIST CVX/VAG CYTO: ABNORMAL
CYTOLOGY CVX/VAG DOC CYTO: ABNORMAL
CYTOLOGY CVX/VAG DOC THIN PREP: ABNORMAL
DX ICD CODE: ABNORMAL
DX ICD CODE: ABNORMAL
HPV I/H RISK 4 DNA CVX QL PROBE+SIG AMP: NEGATIVE
Lab: ABNORMAL
OTHER STN SPEC: ABNORMAL
PATHOLOGIST CVX/VAG CYTO: ABNORMAL
STAT OF ADQ CVX/VAG CYTO-IMP: ABNORMAL

## 2023-05-12 RX ORDER — ERENUMAB-AOOE 70 MG/ML
INJECTION SUBCUTANEOUS
COMMUNITY
Start: 2020-03-19

## 2023-05-12 RX ORDER — CETIRIZINE HYDROCHLORIDE 10 MG/1
TABLET ORAL
COMMUNITY

## 2023-05-12 RX ORDER — IBUPROFEN 200 MG
TABLET ORAL
COMMUNITY

## 2023-05-12 RX ORDER — MONTELUKAST SODIUM 10 MG/1
10 TABLET ORAL DAILY
COMMUNITY

## 2023-05-12 RX ORDER — PANTOPRAZOLE SODIUM 40 MG/1
1 TABLET, DELAYED RELEASE ORAL DAILY
COMMUNITY
Start: 2020-05-26

## 2023-05-12 RX ORDER — LORATADINE 10 MG/1
10 TABLET ORAL
COMMUNITY

## 2023-05-12 RX ORDER — SUMATRIPTAN 100 MG/1
TABLET, FILM COATED ORAL
COMMUNITY

## 2023-05-12 RX ORDER — BUPROPION HYDROCHLORIDE 300 MG/1
1 TABLET ORAL EVERY MORNING
COMMUNITY
Start: 2020-04-13

## 2023-05-12 RX ORDER — BENZONATATE 200 MG/1
CAPSULE ORAL 3 TIMES DAILY PRN
COMMUNITY
Start: 2018-12-19